# Patient Record
Sex: MALE | Race: WHITE | Employment: FULL TIME | URBAN - METROPOLITAN AREA
[De-identification: names, ages, dates, MRNs, and addresses within clinical notes are randomized per-mention and may not be internally consistent; named-entity substitution may affect disease eponyms.]

---

## 2019-08-15 ENCOUNTER — HOSPITAL ENCOUNTER (EMERGENCY)
Age: 59
Discharge: HOME | End: 2019-08-15
Attending: EMERGENCY MEDICINE
Payer: COMMERCIAL

## 2019-08-15 ENCOUNTER — APPOINTMENT (OUTPATIENT)
Dept: GENERAL RADIOLOGY | Age: 59
End: 2019-08-15
Payer: COMMERCIAL

## 2019-08-15 VITALS
HEIGHT: 70 IN | TEMPERATURE: 98 F | DIASTOLIC BLOOD PRESSURE: 75 MMHG | WEIGHT: 201 LBS | BODY MASS INDEX: 28.77 KG/M2 | HEART RATE: 82 BPM | OXYGEN SATURATION: 93 % | SYSTOLIC BLOOD PRESSURE: 166 MMHG | RESPIRATION RATE: 18 BRPM

## 2019-08-15 DIAGNOSIS — S46.912A STRAIN OF LEFT SHOULDER, INITIAL ENCOUNTER: Primary | ICD-10-CM

## 2019-08-15 PROCEDURE — 99283 EMERGENCY DEPT VISIT LOW MDM: CPT

## 2019-08-15 PROCEDURE — 6370000000 HC RX 637 (ALT 250 FOR IP): Performed by: PHYSICIAN ASSISTANT

## 2019-08-15 PROCEDURE — 73030 X-RAY EXAM OF SHOULDER: CPT

## 2019-08-15 RX ORDER — IBUPROFEN 600 MG/1
600 TABLET ORAL ONCE
Status: COMPLETED | OUTPATIENT
Start: 2019-08-15 | End: 2019-08-15

## 2019-08-15 RX ADMIN — IBUPROFEN 600 MG: 600 TABLET, FILM COATED ORAL at 18:08

## 2019-08-15 SDOH — HEALTH STABILITY: MENTAL HEALTH: HOW OFTEN DO YOU HAVE A DRINK CONTAINING ALCOHOL?: NEVER

## 2019-08-15 ASSESSMENT — PAIN SCALES - GENERAL
PAINLEVEL_OUTOF10: 8
PAINLEVEL_OUTOF10: 8

## 2019-08-15 NOTE — ED PROVIDER NOTES
Placed This Encounter   Medications    ibuprofen (ADVIL;MOTRIN) tablet 600 mg       -------------------------      CRITICAL CARE:    CONSULTS:  None    PROCEDURES:  Procedures    FINAL IMPRESSION      1. Strain of left shoulder, initial encounter          DISPOSITION/PLAN   DISPOSITION Decision To Discharge 08/15/2019 06:21:59 PM      PATIENT REFERREDTO:  Leny Morel  [place] [address] [phone]  Schedule an appointment as soon as possible for a visit in 1 day      [facility]  [place] [address] [phone]    If symptoms worsen      DISCHARGEMEDICATIONS:  There are no discharge medications for this patient.       (Please note that portions of this note were completed with a voice recognition program.  Efforts were made to edit thedictations but occasionally words are mis-transcribed.)    JENNIFER Kraft PA-C  08/15/19 1918

## 2020-09-29 ENCOUNTER — HOSPITAL ENCOUNTER (OUTPATIENT)
Dept: GENERAL RADIOLOGY | Age: 60
Discharge: HOME | End: 2020-10-01
Payer: COMMERCIAL

## 2020-09-29 ENCOUNTER — HOSPITAL ENCOUNTER (OUTPATIENT)
Age: 60
Discharge: HOME | End: 2020-09-29
Payer: COMMERCIAL

## 2020-09-29 PROCEDURE — 73030 X-RAY EXAM OF SHOULDER: CPT

## 2020-10-12 ENCOUNTER — HOSPITAL ENCOUNTER (OUTPATIENT)
Dept: PHYSICAL THERAPY | Age: 60
Setting detail: THERAPIES SERIES
Discharge: HOME | End: 2020-10-12
Payer: COMMERCIAL

## 2020-10-12 PROCEDURE — 97110 THERAPEUTIC EXERCISES: CPT

## 2020-10-12 PROCEDURE — 97016 VASOPNEUMATIC DEVICE THERAPY: CPT

## 2020-10-12 PROCEDURE — 97161 PT EVAL LOW COMPLEX 20 MIN: CPT

## 2020-10-12 NOTE — CONSULTS
he used to     Pain:  [x] Yes  [] No Location: L shoulder Pain Rating: (0-10 scale) 8/10  Pain altered Tx:  [x] Yes  [] No  Action: limited tolerance to exercise at this date due to sharp pain    Symptoms:[x] Same  Better: holding arm at waist level, ice  Worse: overhead reach, lifting arm to write or use turn signal, lifting  Sleep:[x] Disturbed - positioning    Objective:     ROM  °A/P END FEEL STRENGTH TESTS (+/-) Left Right Not Tested    Left Right  Left Right Drop Arm   []   Sit Shld Flex 128 pain    150 PROM 152  3 5 Sulcus Sign   []   Sit Shld Abd 44 pain    120 PROM 155  2+ 5 Apprehension   []   Sit Shld IR      Raul   []   Shoulder Flex      Speeds +  []   Ext      Neer   []   ABD      Mary    []   ER @ 0  81 80  4+ 5 Painful Arc +  []   IR    4+ 5 Tinel   []   Elbow Flex. 4 5       Elbow Ext.    4+ 5       Upper trap compensation with L shoulder AROM    OBSERVATION No Deficit Deficit Not Tested Comments   Forward Head [x] [] []    Rounded Shoulders [x] [] []    Kyphosis [x] [] []    Scap Height/Position [] [x] [] L slight superior when compared to the R due to increased UT involvement   Winging [] [x] []    SH Rhythm [] [x] []    INSPECTION/PALPATION       SC/AC Joint [] [x] [] Tender to palpation around the [facility] joint   Supraspinatus [] [x] [] Tender to palpation   Biceps tendon/groove [] [x] [] Tender to palpation of proximal biceps tendon   Posterior shld [] [x] [] Tender to palpation of infraspinatus   Subscapularis [x] [] []    NEUROLOGICAL       Cervical ROM/Quadrant [] [x] [] 10% impaired R side bending  All other cervical ROM WFL   Reflexes [] [] [x]    Compression/Distraction [x] [] []    Sensation [x] [] []      Functional Test: Upper Extremity Functional Index (UEFI) Score: 65% functionally impaired     Comments:    Assessment:  Pt presents with L shoulder pain and difficulty performing overhead activities.  Pt with significant limitations into L shoulder abduction, able to improve by approximately 80 degrees passively compared to actively. Pt reporting increased anterior and posterior shoulder pain. Physical therapy signs and symptoms consistent with L RTC strain with potential tear. Patient would benefit from skilled physical therapy services in order to: increase strength, ROM, functional activity tolerance and to decrease pain in order to return to full duty at work. Problems:    [x] ? Pain: L shoulder, 8/10  [x] ? ROM: impaired L shoulder elevation  [x] ? Strength: impaired L shoulder strength  [x] ? Function: UEFI = 65% functional impairment  [] Other:      STG: (to be met in 9 treatments)  1. ? Pain: Pt will report decreased L shoulder pain to no greater than 4/10 with overhead activities. 2. ? ROM: Pt will increase L shoulder abduction AROM to 100 degrees in order to improve tolerance to reaching activities required for work. 3. ? Strength: Pt will increase L shoulder strength to 4/5 globally in order to return to lifting and carrying activities. 4. ? Function: Pt will demonstrate improved functional activity tolerance as evident by an improved score on the UEFI to less than 40% functional impairment. 5. Patient to be independent with home exercise program as demonstrated by performance with correct form without cues. Patient goals: \"no stabbing pain\"    Rehab Potential:  [] Good  [x] Fair  [] Poor   Suggested Professional Referral:  [x] No  [] Yes:  Barriers to Goal Achievement:  [] No  [x] Yes: potential extent of RTC involvement  Domestic Concerns:  [x] No  [] Yes:    Pt. Education:  [x] Plans/Goals, Risks/Benefits discussed  [x] Home exercise program  Method of Education: [x] Verbal  [x] Demo  [x] Written  Comprehension of Education:  [x] Verbalizes understanding. [x] Demonstrates understanding. [x] Needs Review. [] Demonstrates/verbalizes understanding of HEP/Ed previously given.     Treatment Plan:  [x] Therapeutic Exercise   96391  [] Iontophoresis: 4 mg/mL Dexamethasone Sodium Phosphate  mAmin  67492   [] Therapeutic Activity  56155 [x] Vasopneumatic cold with compression  95681    [] Gait Training   95069 [] Ultrasound   48125   [] Neuromuscular Re-education  12792 [x] Electrical Stimulation Unattended  99411   [x] Manual Therapy  56172 [] Electrical Stimulation Attended  20065   [x] Instruction in HEP  [] Lumbar/Cervical Traction  76659   [] Aquatic Therapy   36037 [x] Cold/hotpack    [] Massage   69947      [] Dry Needling, 1 or 2 muscles  53241   [] Biofeedback, first 15 minutes   09594  [] Biofeedback, additional 15 minutes   41376 [] Dry Needling, 3 or more muscles  34500     []  Medication allergies reviewed for use of    Dexamethasone Sodium Phosphate 4mg/ml     with iontophoresis treatments. Pt is not allergic.        Frequency: 3 x/week for 9 visits    Todays Treatment:  Modalities:   Precautions:  Exercises:  Exercise Reps/ Time Weight/ Level Comments   PROM (flex, abd) 5 min           Cane AAROM (flex, abd) 10x5\"  Pt unable to perform 10 reps into abduction due to increased sharp pain   3 way bicep curl 10x ea     Upper trap stretch 3x30\"                 Other:    Specific Instructions for next treatment: progress ROM exercises      Evaluation Complexity:  History (Personal factors, comorbidities) [] 0 [x] 1-2 [] 3+   Exam (limitations, restrictions) [] 1-2 [x] 3 [] 4+   Clinical presentation (progression) [] Stable [x] Evolving  [] Unstable   Decision Making [x] Low [] Moderate [] High    [x] Low Complexity [] Moderate Complexity [] High Complexity       Treatment Charges: Mins Units Time in-out   [x] Evaluation       [x]  Low       []  Moderate       []  High 30 1 8:05-8:35 am   []  Modalities      [x]  Ther Exercise 15 1 8:35-8:50 am   []  Manual Therapy      []  Ther Activities      []  Aquatics      [x]  Vasocompression 15 1 8:51-9:06 am   []  Other        TOTAL TREATMENT TIME: 60    Time in: 8:00 am    Time Out: 9:07 am    Electronically signed by: Ada Lopez PT        Physician Signature:________________________________Date:__________________  By signing above or cosigning this note, I have reviewed this plan of care and certify a need for medically necessary rehabilitation services.      *PLEASE SIGN ABOVE AND FAX BACK ALL PAGES*

## 2020-10-14 ENCOUNTER — HOSPITAL ENCOUNTER (OUTPATIENT)
Dept: PHYSICAL THERAPY | Age: 60
Setting detail: THERAPIES SERIES
Discharge: HOME | End: 2020-10-14
Payer: COMMERCIAL

## 2020-10-14 PROCEDURE — 97112 NEUROMUSCULAR REEDUCATION: CPT

## 2020-10-14 PROCEDURE — 97110 THERAPEUTIC EXERCISES: CPT

## 2020-10-19 ENCOUNTER — HOSPITAL ENCOUNTER (OUTPATIENT)
Dept: PHYSICAL THERAPY | Age: 60
Setting detail: THERAPIES SERIES
Discharge: HOME | End: 2020-10-19
Payer: COMMERCIAL

## 2020-10-19 PROCEDURE — 97110 THERAPEUTIC EXERCISES: CPT

## 2020-10-19 NOTE — FLOWSHEET NOTE
[address] [facility]              [address]              Phone: [phone]              Fax: [phone]    Physical Therapy Daily Treatment Note    Date:  10/19/2020  Patient Name:  Mariajose Morales    :  1960  MRN: [id number]  Physician: Dr. Oskar Zamorano MD                                Insurance: [facility] 3x/wk for 9 visits  Medical Diagnosis: S46.012D - Strain of L rotator cuff                     Rehab Codes: N58.208Y, M25.512, M62.81  Onset Date: 2020                      Next 's appt:   Visit# / total visits: 3/9    Cancels/No Shows: 0/0    Subjective:    Pain:  [x] Yes  [] No Location: L shoulder Pain Rating: (0-10 scale) 6-8/10  Pain altered Tx:  [] No  [] Yes  Action:  Comments: Pt continues to report high pain levels. He does note that he is taking less Tylenol, PRN, reports that he did not take any prior to coming in today. Pt reports that an MRI has been ordered. Objective:  Modalities: cold pack (CP) to L shoulder x 10 min  Precautions:  Exercises:  Exercise Reps/ Time Weight/ Level    UBE 2'/2'  x         PROM (flex, abd) 12 min   x            Cane AAROM (flex, abd) 10x5\"   x   3 way bicep curl 10x ea  2# x   Upper trap stretch 3x30\"   x   Seated scap retraction  10x5\"    x   Shoulder isometrics  10x5\" ea   x   Table slides 10x ea     Other:     Specific Instructions for next treatment: progress ROM exercises           Treatment Charges: Mins Units Time in-out   [x]  Modalities: CP 10 -- 8:45-8:55 am   [x]  Ther Exercise 40 3 8:04-8:44 am   []  Manual Therapy      []  Ther Activities      []  Aquatics      []  Vasocompression      []  Other      Total Treatment time 40 3 8:04-8:44 am   UBE warm up not billed, 8:00-8:04 am    Assessment: [] Progressing toward goals. [x] No change. Pt continues to demonstrate pain with AA/AROM of the L shoulder, reporting sharp pains specifically with abduction.  Focused on continuing to restore ROM at this date. Pt reporting increased pain levels following cane AAROM to 8/10. [] Other:  [x] Patient would continue to benefit from skilled physical therapy services in order to: restore motion and strength in order to return to full work duties without increased pain or difficulty. STG: (to be met in 9 treatments)  1. ? Pain: Pt will report decreased L shoulder pain to no greater than 4/10 with overhead activities. 2. ? ROM: Pt will increase L shoulder abduction AROM to 100 degrees in order to improve tolerance to reaching activities required for work. 3. ? Strength: Pt will increase L shoulder strength to 4/5 globally in order to return to lifting and carrying activities. 4. ? Function: Pt will demonstrate improved functional activity tolerance as evident by an improved score on the UEFI to less than 40% functional impairment. 5. Patient to be independent with home exercise program as demonstrated by performance with correct form without cues.                    Patient goals: \"no stabbing pain\"    Pt. Education:  [x] Yes  [] No  [x] Reviewed Prior HEP/Ed  Method of Education: [] Verbal  [x] Demo  [] Written  Comprehension of Education:  [] Verbalizes understanding. [x] Demonstrates understanding. [x] Needs review. [] Demonstrates/verbalizes HEP/Ed previously given. Plan: [x] Continue current frequency toward long and short term goals.     [x] Specific Instructions for subsequent treatments: progress to AROM as able      Time In: 8:00 am            Time Out: 8:55 am    Electronically signed by:  Ace Zhang, PT

## 2020-10-20 ENCOUNTER — HOSPITAL ENCOUNTER (OUTPATIENT)
Dept: PHYSICAL THERAPY | Age: 60
Setting detail: THERAPIES SERIES
Discharge: HOME | End: 2020-10-20
Payer: COMMERCIAL

## 2020-10-20 PROCEDURE — 97110 THERAPEUTIC EXERCISES: CPT

## 2020-10-20 NOTE — FLOWSHEET NOTE
[address] [facility]              [address]              Phone: [phone]              Fax: [phone]    Physical Therapy Daily Treatment Note    Date:  10/20/2020  Patient Name:  Francisco Javier Huizar    :  1960  MRN: [id number]  Physician: Dr. Glenda Romo MD                                Insurance: [facility] 3x/wk for 9 visits  Medical Diagnosis: S46.012D - Strain of L rotator cuff                     Rehab Codes: U10.608M, M25.512, M62.81  Onset Date: 2020                      Next 's appt:   Visit# / total visits:     Cancels/No Shows: 0/0    Subjective:    Pain:  [x] Yes  [] No Location: L shoulder Pain Rating: (0-10 scale) 6/10  Pain altered Tx:  [] No  [] Yes  Action:  Comments: Pt reports he has noticed a difference in pain since switching to ibuprofen at night before bed states he wakes up less stiff. Objective:  Modalities: cold pack (CP) to L shoulder x 10 min- denied need to be at work earlier today. Precautions:  Exercises:  Exercise Reps/ Time Weight/ Level    UBE 2'/2'  x         PROM (flex, abd) 12 min   x            Cane AAROM (flex, abd) 10x5\"   x   3 way bicep curl 10x ea  2# x   Upper trap stretch 3x30\"   x   Seated scap retraction  10x5\"    x   Shoulder isometrics  10x5\" ea   x   Maudie Canton #5 1x   x   \"W\" ball on wall 10x ea  x   Pulley stretch  10x5\" ea  Fwd/lat/ scaption          Table slides 10x ea     Other:     Specific Instructions for next treatment: progress ROM exercises           Treatment Charges: Mins Units Time in-out   [x]  Modalities: CP      [x]  Ther Exercise 45 3 8:04-8:49   []  Manual Therapy      []  Ther Activities      []  Aquatics      []  Vasocompression      []  Other      Total Treatment time 45 3 8:04-8:49 am   UBE warm up not billed, 8:00-8:04 am    Assessment: [] Progressing toward goals. [x] No change.  Pt continues toto noted pain at end range during active and active assistive range of motion exercises. Did not end with cold pack this date due to patient having time restrictions for work. [] Other:  [x] Patient would continue to benefit from skilled physical therapy services in order to: restore motion and strength in order to return to full work duties without increased pain or difficulty. STG: (to be met in 9 treatments)  1. ? Pain: Pt will report decreased L shoulder pain to no greater than 4/10 with overhead activities. 2. ? ROM: Pt will increase L shoulder abduction AROM to 100 degrees in order to improve tolerance to reaching activities required for work. 3. ? Strength: Pt will increase L shoulder strength to 4/5 globally in order to return to lifting and carrying activities. 4. ? Function: Pt will demonstrate improved functional activity tolerance as evident by an improved score on the UEFI to less than 40% functional impairment. 5. Patient to be independent with home exercise program as demonstrated by performance with correct form without cues.                    Patient goals: \"no stabbing pain\"    Pt. Education:  [x] Yes  [] No  [x] Reviewed Prior HEP/Ed  Method of Education: [] Verbal  [x] Demo  [] Written  Comprehension of Education:  [] Verbalizes understanding. [x] Demonstrates understanding. [x] Needs review. [] Demonstrates/verbalizes HEP/Ed previously given. Plan: [x] Continue current frequency toward long and short term goals.     [x] Specific Instructions for subsequent treatments: progress to AROM as able      Time In: 8:00 am            Time Out: 8:45 am    Electronically signed by:  Familia Woodard PTA

## 2020-10-22 ENCOUNTER — APPOINTMENT (OUTPATIENT)
Dept: PHYSICAL THERAPY | Age: 60
End: 2020-10-22
Payer: COMMERCIAL

## 2020-10-26 ENCOUNTER — HOSPITAL ENCOUNTER (OUTPATIENT)
Dept: PHYSICAL THERAPY | Age: 60
Setting detail: THERAPIES SERIES
Discharge: HOME | End: 2020-10-26
Payer: COMMERCIAL

## 2020-10-26 PROCEDURE — 97110 THERAPEUTIC EXERCISES: CPT

## 2020-10-26 NOTE — FLOWSHEET NOTE
[address] [facility] [facility]               [address]              Phone: [phone]              Fax: [phone]    Physical Therapy Daily Treatment Note    Date:  10/26/2020  Patient Name:  Deette Leyden    :  1960  MRN: [id number]  Physician: Dr. Horace So MD                                Insurance: [employer] 3x/wk for 9 visits  Medical Diagnosis: S46.012D - Strain of L rotator cuff                     Rehab Codes: N43.636I, M25.512, M62.81  Onset Date: 2020                      Next 's appt:   Visit# / total visits:     Cancels/No Shows: 0/0    Subjective:    Pain:  [x] Yes  [] No Location: L shoulder Pain Rating: (0-10 scale) 6/10  Pain altered Tx:  [] No  [] Yes  Action:  Comments: Pt reports he has more of a pinpointed pain in anterior shoulder region that hurts mostly with abduction movement. Objective:  Modalities: cold pack (CP) to L shoulder x 10 min- denied need to be at work earlier today. Precautions:  Exercises:  Exercise Reps/ Time Weight/ Level    UBE 2'/2'  x         PROM (flex, abd) 12 min   x            Cane AAROM (flex, abd) 10x5\"   x   3 way bicep curl 10x ea  2# x   Upper trap stretch 3x30\"   x   Seated scap retraction  10x5\"    x   Shoulder isometrics  10x5\" ea   x   Carlean Helm #5 1x   x   \"W\" ball on wall 10x ea  x   Pulley stretch  10x5\" ea  Fwd/lat/ scaption          Table slides 10x ea     Other:     Specific Instructions for next treatment: progress ROM exercises           Treatment Charges: Mins Units Time in-out   [x]  Modalities: CP      [x]  Ther Exercise 45 3 8:05-8:50   []  Manual Therapy      []  Ther Activities      []  Aquatics      []  Vasocompression      []  Other      Total Treatment time 45 3 8:05-8:50 am   UBE warm up not billed, 8:01-8:05 am    Assessment: [] Progressing toward goals. [x] No change.  Pt had increased pain symptoms with most exercises this date often noted it to be sharp and radites to mid delt. Pain during PROM this date especially with abduction. [] Other:  [x] Patient would continue to benefit from skilled physical therapy services in order to: restore motion and strength in order to return to full work duties without increased pain or difficulty. STG: (to be met in 9 treatments)  1. ? Pain: Pt will report decreased L shoulder pain to no greater than 4/10 with overhead activities. 2. ? ROM: Pt will increase L shoulder abduction AROM to 100 degrees in order to improve tolerance to reaching activities required for work. 3. ? Strength: Pt will increase L shoulder strength to 4/5 globally in order to return to lifting and carrying activities. 4. ? Function: Pt will demonstrate improved functional activity tolerance as evident by an improved score on the UEFI to less than 40% functional impairment. 5. Patient to be independent with home exercise program as demonstrated by performance with correct form without cues.                    Patient goals: \"no stabbing pain\"    Pt. Education:  [x] Yes  [] No  [x] Reviewed Prior HEP/Ed  Method of Education: [] Verbal  [x] Demo  [] Written  Comprehension of Education:  [] Verbalizes understanding. [x] Demonstrates understanding. [x] Needs review. [] Demonstrates/verbalizes HEP/Ed previously given. Plan: [x] Continue current frequency toward long and short term goals.     [x] Specific Instructions for subsequent treatments: progress to AROM as able      Time In: 8:01 am            Time Out: 8:50 am    Electronically signed by:  Raghav Kimble PTA

## 2020-10-27 ENCOUNTER — HOSPITAL ENCOUNTER (OUTPATIENT)
Dept: PHYSICAL THERAPY | Age: 60
Setting detail: THERAPIES SERIES
Discharge: HOME | End: 2020-10-27
Payer: COMMERCIAL

## 2020-10-27 PROCEDURE — 97110 THERAPEUTIC EXERCISES: CPT

## 2020-10-27 NOTE — FLOWSHEET NOTE
[address] [facility] Out[facility] Therapy              [address]              Phone: [phone]              Fax: [phone]    Physical Therapy Daily Treatment Note    Date:  10/27/2020  Patient Name:  Jamie Hinton    :  1960  MRN: [id number]  Physician: Dr. Johan Slade MD                                Insurance: [facility] 3x/wk for 9 visits  Medical Diagnosis: S46.012D - Strain of L rotator cuff                     Rehab Codes: P33.846L, M25.512, M62.81  Onset Date: 2020                      Next 's appt:   Visit# / total visits:     Cancels/No Shows: 0/0    Subjective:    Pain:  [x] Yes  [] No Location: L shoulder Pain Rating: (0-10 scale) 610  Pain altered Tx:  [] No  [] Yes  Action:  Comments:once again patient reports limited abduction and ER movement along with pain when he tries to actively stretch or complete exercise in those two directions. Objective:  Modalities: cold pack (CP) to L shoulder x 10 min- denied    Precautions:  Exercises:  Exercise Reps/ Time Weight/ Level    UBE 2'/2'  x         PROM (flex, abd) 12 min   x            Cane AAROM (flex, abd) 10x5\"   x   3 way bicep curl 10x ea  2# x   Upper trap stretch 3x30\"   x   Seated scap retraction  10x5\"    x   Shoulder isometrics  10x5\" ea   x   Gannon Jump #5 1x   x   \"W\" ball on wall 10x ea  x   Pulley stretch  10x5\" ea  Fwd/lat/ scaption x         Table slides 10x ea  x   Supine scap protraction 10x  x   Supine scap X,O 10ea  cw,ccw x         Other:     Specific Instructions for next treatment: progress ROM exercises           Treatment Charges: Mins Units Time in-out   [x]  Modalities: CP      [x]  Ther Exercise 45 3 8:05-8:50   []  Manual Therapy      []  Ther Activities      []  Aquatics      []  Vasocompression      []  Other      Total Treatment time 45 3 8:05-8:50 am   UBE warm up not billed, 8:01-8:05 am    Assessment: [] Progressing toward goals. [x] No change.  Continued to have pain at end range especially in abd and ER. Patient demonstrated weakness during supine scapular exercises this date causing shoulder fatigue and tremors but subsided after short rest breaks. [] Other:  [x] Patient would continue to benefit from skilled physical therapy services in order to: restore motion and strength in order to return to full work duties without increased pain or difficulty. STG: (to be met in 9 treatments)  1. ? Pain: Pt will report decreased L shoulder pain to no greater than 4/10 with overhead activities. 2. ? ROM: Pt will increase L shoulder abduction AROM to 100 degrees in order to improve tolerance to reaching activities required for work. 3. ? Strength: Pt will increase L shoulder strength to 4/5 globally in order to return to lifting and carrying activities. 4. ? Function: Pt will demonstrate improved functional activity tolerance as evident by an improved score on the UEFI to less than 40% functional impairment. 5. Patient to be independent with home exercise program as demonstrated by performance with correct form without cues.                    Patient goals: \"no stabbing pain\"    Pt. Education:  [x] Yes  [] No  [x] Reviewed Prior HEP/Ed  Method of Education: [] Verbal  [x] Demo  [] Written  Comprehension of Education:  [] Verbalizes understanding. [x] Demonstrates understanding. [x] Needs review. [] Demonstrates/verbalizes HEP/Ed previously given. Plan: [x] Continue current frequency toward long and short term goals.     [x] Specific Instructions for subsequent treatments: progress to AROM as able      Time In: 8:01 am            Time Out: 8:50 am    Electronically signed by:  Karime Rosario PTA

## 2020-10-28 ENCOUNTER — HOSPITAL ENCOUNTER (OUTPATIENT)
Dept: MRI IMAGING | Facility: CLINIC | Age: 60
Discharge: HOME | End: 2020-10-30
Payer: COMMERCIAL

## 2020-10-28 PROCEDURE — 73221 MRI JOINT UPR EXTREM W/O DYE: CPT

## 2020-10-29 ENCOUNTER — HOSPITAL ENCOUNTER (OUTPATIENT)
Dept: PHYSICAL THERAPY | Age: 60
Setting detail: THERAPIES SERIES
Discharge: HOME | End: 2020-10-29
Payer: COMMERCIAL

## 2020-10-29 PROCEDURE — 97110 THERAPEUTIC EXERCISES: CPT

## 2020-10-29 NOTE — PROGRESS NOTES
[address] [facility]              [address]              Phone: [phone]              Fax: [phone]     Physical Therapy Daily Treatment Note     Date:  10/14/2020  Patient Name:  Pan Fuller                   :  1960                   MRN: [id number]  Physician: Dr. Bebeto Bailey MD                                Insurance: WC 3x/wk for 9 visits  Medical Diagnosis: S46.012D - Strain of L rotator cuff                     Rehab Codes: S46.012D, M25.512, M62.81  Onset Date: 2020                      Next 's appt:   Visit# / total visits:                         Cancels/No Shows: 0/0     Subjective:    Pain:  [x]? Yes  []? No   Location: L shoulder   Pain Rating: (0-10 scale) 6-8/10  Pain altered Tx:  []? No  []? Yes  Action:  Comments: Pt continues to report high pain levels. Very limited with active elevation of the shoulder at this time.     Objective:  Modalities: cold pack (CP) to L shoulder x 10 min  Precautions:  Exercises:  Exercise Reps/ Time Weight/ Level     UBE 2'/2'   x             PROM (flex, abd) 12 min   x             Cane AAROM (flex, abd) 10x5\"   x   3 way bicep curl 10x ea  2# x   Upper trap stretch 3x30\"   x   Seated scap retraction  10x5\"    x   Shoulder isometrics  10x5\" ea   x   Table slides 10x ea    x   Other:     Specific Instructions for next treatment: progress ROM exercises              Treatment Charges: Mins Units Time in-out   [x]? Modalities: CP 10 -- 8:45-8:55 am   [x]? Ther Exercise 40 3 8:00-8:40 am   []? Manual Therapy         []? Ther Activities         []? Aquatics         []? Vasocompression         []? Other         Total Treatment time 40 3 8:00-8:40am   CP not counted in treatment time.     Assessment:  []? Progressing toward goals. [x]? No change. Pt very limited with AROM, added ISOMS and other light stretching of the neck and shoulder within tolerance. Reviewed table slides and ISOMs for home as well verbally. []? Other:  [x]? Patient would continue to benefit from skilled physical therapy services in order to: restore motion and strength in order to return to full work duties without increased pain or difficulty.     STG: (to be met in 9 treatments)  1. ? Pain: Pt will report decreased L shoulder pain to no greater than 4/10 with overhead activities. 2. ? ROM: Pt will increase L shoulder abduction AROM to 100 degrees in order to improve tolerance to reaching activities required for work. 3. ? Strength: Pt will increase L shoulder strength to 4/5 globally in order to return to lifting and carrying activities. 4. ? Function: Pt will demonstrate improved functional activity tolerance as evident by an improved score on the UEFI to less than 40% functional impairment.   5. Patient to be independent with home exercise program as demonstrated by performance with correct form without cues.                   Patient goals: \"no stabbing pain\"     Pt. Education:  [x]? Yes  []? No  [x]? Reviewed Prior HEP/Ed  Method of Education: []? Verbal  [x]? Demo  []? Written  Comprehension of Education:  []? Verbalizes understanding. [x]? Demonstrates understanding. [x]? Needs review. []? Demonstrates/verbalizes HEP/Ed previously given.              Plan:    [x]? Continue current frequency toward long and short term goals. [x]?  Specific Instructions for subsequent treatments: progress to AROM as able                            Time In: 8:00 am            Time Out: 8:55 am     Electronically signed by:  Oleg Martinez PT

## 2020-10-29 NOTE — FLOWSHEET NOTE
scapular exercises. [] Other:  [x] Patient would continue to benefit from skilled physical therapy services in order to: restore motion and strength in order to return to full work duties without increased pain or difficulty. STG: (to be met in 9 treatments)  1. ? Pain: Pt will report decreased L shoulder pain to no greater than 4/10 with overhead activities. 2. ? ROM: Pt will increase L shoulder abduction AROM to 100 degrees in order to improve tolerance to reaching activities required for work. 3. ? Strength: Pt will increase L shoulder strength to 4/5 globally in order to return to lifting and carrying activities. 4. ? Function: Pt will demonstrate improved functional activity tolerance as evident by an improved score on the UEFI to less than 40% functional impairment. 5. Patient to be independent with home exercise program as demonstrated by performance with correct form without cues.                    Patient goals: \"no stabbing pain\"    Pt. Education:  [x] Yes  [] No  [x] Reviewed Prior HEP/Ed  Method of Education: [] Verbal  [x] Demo  [] Written  Comprehension of Education:  [] Verbalizes understanding. [x] Demonstrates understanding. [x] Needs review. [] Demonstrates/verbalizes HEP/Ed previously given. Plan: [x] Continue current frequency toward long and short term goals.     [x] Specific Instructions for subsequent treatments: progress to AROM as able      Time In: 8:00 am            Time Out: 8:49 am    Electronically signed by:  Shelbi Trejo PTA

## 2020-11-04 NOTE — DISCHARGE SUMMARY
[address] [facility]              [address] [facility] [address]              Phone: [phone]              Fax: [phone]      Physical Therapy Discharge Note    Date: 2020      Patient: Mitchell Schumacher  : 1960  MRN: [id number]    Physician: Dr. Rachel Reddy MD                                Insurance: WC 3x/wk for 9 visits  Medical Diagnosis: S46.012D - Strain of L rotator cuff                     Rehab RMJEO: Y64.547U, M25.512, M62.81  Onset Date: 2020                      Next 's appt:   Visit# / total visits:                         Cancels/No Shows: 0/0  Date of initial visit: 10/12/2020                Date of final visit: 10/29/2020      Subjective:  Unplanned discharge. Pt to have surgery for L rotator cuff repair due to continued high pain levels and little change in strength and motion throughout therapy. Assessment:  STG: (to be met in 9 treatments)  1. ? Pain: Pt will report decreased L shoulder pain to no greater than 4/10 with overhead activities. - not met  2. ? ROM: Pt will increase L shoulder abduction AROM to 100 degrees in order to improve tolerance to reaching activities required for work. - not met  3. ? Strength: Pt will increase L shoulder strength to 4/5 globally in order to return to lifting and carrying activities. - not met  4. ? Function: Pt will demonstrate improved functional activity tolerance as evident by an improved score on the UEFI to less than 40% functional impairment.  - not assessed  5. Patient to be independent with home exercise program as demonstrated by performance with correct form without cues.  - not met consistently                    Patient goals: \"no stabbing pain\"    Treatment to Date:  [x] Therapeutic Exercise    [] Modalities:  [] Therapeutic Activity    [] Ultrasound  [] Electrical Stimulation  [] Gait Training     [] Massage       [] Lumbar/Cervical Traction  [] Neuromuscular Re-education [] Cold/hotpack [] Iontophoresis: 4 mg/mL  [x] Instruction in Home Exercise Program                     Dexamethasone Sodium  [] Manual Therapy             Phosphate 40-80 mAmin  [] Aquatic Therapy                   [] Vasocompression/    [] Other:             Game Ready    Discharge Status:     [] Pt recovered from conditions. Treatment goals were met. [] Pt received maximum benefit. No further therapy indicated at this time. [] Pt to continue exercise/home instructions independently. [x] Therapy interrupted due to: pt to have surgery    [] Pt has 2 or more no shows/cancels, is discontinued per our policy. [] Pt has completed prescribed number of treatment sessions. [] Other:         Electronically signed by Lucy Putnam PT on 11/4/2020 at 11:23 AM      If you have any questions or concerns, please don't hesitate to call.   Thank you for your referral.

## 2021-02-08 ENCOUNTER — HOSPITAL ENCOUNTER (OUTPATIENT)
Age: 61
Discharge: HOME | End: 2021-02-08
Payer: COMMERCIAL

## 2021-02-08 ENCOUNTER — HOSPITAL ENCOUNTER (OUTPATIENT)
Dept: PREADMISSION TESTING | Age: 61
Discharge: HOME | End: 2021-02-12
Payer: COMMERCIAL

## 2021-02-08 VITALS
TEMPERATURE: 97.1 F | WEIGHT: 207 LBS | RESPIRATION RATE: 16 BRPM | DIASTOLIC BLOOD PRESSURE: 86 MMHG | BODY MASS INDEX: 29.63 KG/M2 | SYSTOLIC BLOOD PRESSURE: 137 MMHG | HEART RATE: 76 BPM | HEIGHT: 70 IN | OXYGEN SATURATION: 98 %

## 2021-02-08 LAB
ABSOLUTE EOS #: 0.3 K/UL (ref 0–0.4)
ABSOLUTE IMMATURE GRANULOCYTE: ABNORMAL K/UL (ref 0–0.3)
ABSOLUTE LYMPH #: 2.8 K/UL (ref 1–4.8)
ABSOLUTE MONO #: 0.8 K/UL (ref 0.1–1.3)
ALT SERPL-CCNC: 27 U/L (ref 5–41)
ANION GAP SERPL CALCULATED.3IONS-SCNC: 8 MMOL/L (ref 9–17)
BASOPHILS # BLD: 1 % (ref 0–2)
BASOPHILS ABSOLUTE: 0.1 K/UL (ref 0–0.2)
BUN BLDV-MCNC: 18 MG/DL (ref 8–23)
BUN/CREAT BLD: ABNORMAL (ref 9–20)
CALCIUM SERPL-MCNC: 9.6 MG/DL (ref 8.6–10.4)
CHLORIDE BLD-SCNC: 103 MMOL/L (ref 98–107)
CHOLESTEROL/HDL RATIO: 5.6
CHOLESTEROL: 196 MG/DL
CO2: 27 MMOL/L (ref 20–31)
CREAT SERPL-MCNC: 0.75 MG/DL (ref 0.7–1.2)
DIFFERENTIAL TYPE: ABNORMAL
EOSINOPHILS RELATIVE PERCENT: 3 % (ref 0–4)
GFR AFRICAN AMERICAN: >60 ML/MIN
GFR NON-AFRICAN AMERICAN: >60 ML/MIN
GFR SERPL CREATININE-BSD FRML MDRD: ABNORMAL ML/MIN/{1.73_M2}
GFR SERPL CREATININE-BSD FRML MDRD: ABNORMAL ML/MIN/{1.73_M2}
GLUCOSE BLD-MCNC: 116 MG/DL (ref 70–99)
HCT VFR BLD CALC: 48.9 % (ref 41–53)
HDLC SERPL-MCNC: 35 MG/DL
HEMOGLOBIN: 16.6 G/DL (ref 13.5–17.5)
IMMATURE GRANULOCYTES: ABNORMAL %
LDL CHOLESTEROL: 116 MG/DL (ref 0–130)
LYMPHOCYTES # BLD: 30 % (ref 24–44)
MCH RBC QN AUTO: 32.3 PG (ref 26–34)
MCHC RBC AUTO-ENTMCNC: 34 G/DL (ref 31–37)
MCV RBC AUTO: 94.9 FL (ref 80–100)
MONOCYTES # BLD: 8 % (ref 1–7)
NRBC AUTOMATED: ABNORMAL PER 100 WBC
PDW BLD-RTO: 12.6 % (ref 11.5–14.9)
PLATELET # BLD: 256 K/UL (ref 150–450)
PLATELET ESTIMATE: ABNORMAL
PMV BLD AUTO: 8.1 FL (ref 6–12)
POTASSIUM SERPL-SCNC: 4.5 MMOL/L (ref 3.7–5.3)
PROSTATE SPECIFIC ANTIGEN: 2.69 UG/L
RBC # BLD: 5.15 M/UL (ref 4.5–5.9)
RBC # BLD: ABNORMAL 10*6/UL
SEG NEUTROPHILS: 58 % (ref 36–66)
SEGMENTED NEUTROPHILS ABSOLUTE COUNT: 5.4 K/UL (ref 1.3–9.1)
SODIUM BLD-SCNC: 138 MMOL/L (ref 135–144)
TRIGL SERPL-MCNC: 225 MG/DL
VITAMIN D 25-HYDROXY: 31.2 NG/ML (ref 30–100)
VLDLC SERPL CALC-MCNC: ABNORMAL MG/DL (ref 1–30)
WBC # BLD: 9.3 K/UL (ref 3.5–11)
WBC # BLD: ABNORMAL 10*3/UL

## 2021-02-08 PROCEDURE — 82306 VITAMIN D 25 HYDROXY: CPT

## 2021-02-08 PROCEDURE — 85025 COMPLETE CBC W/AUTO DIFF WBC: CPT

## 2021-02-08 PROCEDURE — 80048 BASIC METABOLIC PNL TOTAL CA: CPT

## 2021-02-08 PROCEDURE — 93005 ELECTROCARDIOGRAM TRACING: CPT | Performed by: SURGERY

## 2021-02-08 PROCEDURE — 36415 COLL VENOUS BLD VENIPUNCTURE: CPT

## 2021-02-08 PROCEDURE — 80061 LIPID PANEL: CPT

## 2021-02-08 PROCEDURE — G0103 PSA SCREENING: HCPCS

## 2021-02-08 PROCEDURE — 84460 ALANINE AMINO (ALT) (SGPT): CPT

## 2021-02-08 RX ORDER — MULTIVIT WITH MINERALS/LUTEIN
250 TABLET ORAL DAILY
COMMUNITY

## 2021-02-08 RX ORDER — CHLORAL HYDRATE 500 MG
1000 CAPSULE ORAL DAILY
COMMUNITY

## 2021-02-08 RX ORDER — ACETAMINOPHEN 500 MG
500 TABLET ORAL EVERY 6 HOURS PRN
COMMUNITY

## 2021-02-08 RX ORDER — LANOLIN ALCOHOL/MO/W.PET/CERES
1000 CREAM (GRAM) TOPICAL DAILY
COMMUNITY

## 2021-02-08 NOTE — H&P (VIEW-ONLY)
HISTORY and [id number]       NAME:  Adelia Burns  MRN: [id number]   YOB: 1960   Date: 2/8/2021   Age: [address] y.o. Gender: male       COMPLAINT AND PRESENT HISTORY:   Adelia Burns is [address] y.o.,   male, undergoing preadmission testing for Colonoscopy Diagnostic and hemorrhoidectomy W/EUS By Dr Magalys Santa. Prior Colonoscopy was done 5 years with  polyp removed. HPI:  Patient states, he  has hx of Colon Polyps x2 was removed 5 years ago. He denies any  FH of Colon Cancer. Patient denies constipation or diarrhea ,  No GI /Rectal bleeding, experiencing red/ black/ BRBPR stools. Pt states, once a while he notice some blood on the tissue when he wiping himself, from hemorrhoid. Patient has no history or complain of abdominal  pain, no nausea or vomiting, no heartburn. Patient denies any Dysphagia. Pt denies fever/chills, chest pain or SOB, no problem with anesthesia, no Hx of MRSA infection. No anticoagulant medication or aspirin use     PMH:  High triglycerides pt on Omega-3 fatty acids (fish oil)    PAST MEDICAL HISTORY     Past Medical History:   Diagnosis Date    High triglycerides        SURGICAL HISTORY       Past Surgical History:   Procedure Laterality Date    APPENDECTOMY      COLONOSCOPY      x 2    HERNIA REPAIR Bilateral     inguinal    TONSILLECTOMY         FAMILY HISTORY     History reviewed. No pertinent family history.     SOCIAL HISTORY       Social History     Socioeconomic History    Marital status:      Spouse name: None    Number of children: None    Years of education: None    Highest education level: None   Occupational History    None   Social Needs    Financial resource strain: None    Food insecurity     Worry: None     Inability: None    Transportation needs     Medical: None     Non-medical: None   Tobacco Use    Smoking status: Current Every Day Smoker     Packs/day: 1.00     Types: Cigarettes  Smokeless tobacco: Never Used   Substance and Sexual Activity    Alcohol use: Never     Frequency: Never    Drug use: Never    Sexual activity: None   Lifestyle    Physical activity     Days per week: None     Minutes per session: None    Stress: None   Relationships    Social connections     Talks on phone: None     Gets together: None     Attends [employer] service: None     Active member of club or organization: None     Attends meetings of clubs or organizations: None     Relationship status: None    Intimate partner violence     Fear of current or ex partner: None     Emotionally abused: None     Physically abused: None     Forced sexual activity: None   Other Topics Concern    None   Social History Narrative    None           REVIEW OF SYSTEMS      Allergies   Allergen Reactions    Asa [Aspirin] Nausea Only       Current Outpatient Medications on File Prior to Encounter   Medication Sig Dispense Refill    vitamin B-12 (CYANOCOBALAMIN) 1000 MCG tablet Take 1,000 mcg by mouth daily      Ascorbic Acid (VITAMIN C) 250 MG tablet Take 250 mg by mouth daily      Omega-3 Fatty Acids (FISH OIL) 1000 MG CAPS Take 1,000 mg by mouth daily      Misc Natural Products (GLUCOSAMINE CHOND CMP ADVANCED PO) Take by mouth daily      acetaminophen (TYLENOL) 500 MG tablet Take 500 mg by mouth every 6 hours as needed for Pain       No current facility-administered medications on file prior to encounter. Negative except for what is mentioned in the HPI. GENERAL PHYSICAL EXAM     Vitals: /86   Pulse 76   Temp 97.1 °F (36.2 °C)   Resp 16   Ht 5' 10\" (1.778 m)   Wt 207 lb (93.9 kg)   SpO2 98%   BMI 29.70 kg/m²  Body mass index is 29.7 kg/m². GENERAL APPEARANCE:   Maine Tovar is 61 y.o.,  male, not obese, nourished, conscious, alert. Does not appear to be distress or pain at this time. SKIN:  Warm, dry, no cyanosis or jaundice. HEAD:  Normocephalic, atraumatic, no swelling or tenderness. EYES:  Pupils equal, reactive to light. EARS:  No discharge, no marked hearing loss. NOSE:  No rhinorrhea, epistaxis or septal deformity. THROAT:  Not congested. No ulceration bleeding or discharge. NECK:  No stiffness, trachea central.  No palpable masses or L.N.                 CHEST:  Symmetrical and equal on expansion. HEART:  RRR S1 > S2. No audible murmurs or gallops. LUNGS:  Equal on expansion, normal breath sounds. No adventitious sounds. ABDOMEN:  Soft on palpation. No dysphagia, No localized tenderness. No guarding or rigidity. No palpable hepatosplenomegaly. Bowel sounds active in all 4 quadrants. LYMPHATICS:  No palpable cervical lymphadenopathy. LOCOMOTOR, BACK AND SPINE:  No tenderness or deformities. EXTREMITIES:  Symmetrical, no pretibial edema. No calf tenderness, No discoloration or ulcerations. NEUROLOGIC:  The patient is conscious, alert, oriented, No apparent focal sensory or motor deficits.              PROVISIONAL DIAGNOSES / SURGERY:      RECTAL BLEEDING HEMORRHOIDS  HEMORRHOIDECTOMY W/EUS  COLONOSCOPY DIAGNOSTIC    Patient Active Problem List    Diagnosis Date Noted    High triglycerides            MIGUEL Pruett CNP on 2/8/2021 at 10:31 AM

## 2021-02-08 NOTE — H&P
 Smokeless tobacco: Never Used   Substance and Sexual Activity    Alcohol use: Never     Frequency: Never    Drug use: Never    Sexual activity: None   Lifestyle    Physical activity     Days per week: None     Minutes per session: None    Stress: None   Relationships    Social connections     Talks on phone: None     Gets together: None     Attends Mandaeism service: None     Active member of club or organization: None     Attends meetings of clubs or organizations: None     Relationship status: None    Intimate partner violence     Fear of current or ex partner: None     Emotionally abused: None     Physically abused: None     Forced sexual activity: None   Other Topics Concern    None   Social History Narrative    None           REVIEW OF SYSTEMS      Allergies   Allergen Reactions    Asa [Aspirin] Nausea Only       Current Outpatient Medications on File Prior to Encounter   Medication Sig Dispense Refill    vitamin B-12 (CYANOCOBALAMIN) 1000 MCG tablet Take 1,000 mcg by mouth daily      Ascorbic Acid (VITAMIN C) 250 MG tablet Take 250 mg by mouth daily      Omega-3 Fatty Acids (FISH OIL) 1000 MG CAPS Take 1,000 mg by mouth daily      Misc Natural Products (GLUCOSAMINE CHOND CMP ADVANCED PO) Take by mouth daily      acetaminophen (TYLENOL) 500 MG tablet Take 500 mg by mouth every 6 hours as needed for Pain       No current facility-administered medications on file prior to encounter. Negative except for what is mentioned in the HPI. GENERAL PHYSICAL EXAM     Vitals: /86   Pulse 76   Temp 97.1 °F (36.2 °C)   Resp 16   Ht 5' 10\" (1.778 m)   Wt 207 lb (93.9 kg)   SpO2 98%   BMI 29.70 kg/m²  Body mass index is 29.7 kg/m². GENERAL APPEARANCE:   Bakari Camarena is 61 y.o.,  male, not obese, nourished, conscious, alert. Does not appear to be distress or pain at this time. SKIN:  Warm, dry, no cyanosis or jaundice. HEAD:  Normocephalic, atraumatic, no swelling or tenderness. EYES:  Pupils equal, reactive to light. EARS:  No discharge, no marked hearing loss. NOSE:  No rhinorrhea, epistaxis or septal deformity. THROAT:  Not congested. No ulceration bleeding or discharge. NECK:  No stiffness, trachea central.  No palpable masses or L.N.                 CHEST:  Symmetrical and equal on expansion. HEART:  RRR S1 > S2. No audible murmurs or gallops. LUNGS:  Equal on expansion, normal breath sounds. No adventitious sounds. ABDOMEN:  Soft on palpation. No dysphagia, No localized tenderness. No guarding or rigidity. No palpable hepatosplenomegaly. Bowel sounds active in all 4 quadrants. LYMPHATICS:  No palpable cervical lymphadenopathy. LOCOMOTOR, BACK AND SPINE:  No tenderness or deformities. EXTREMITIES:  Symmetrical, no pretibial edema. No calf tenderness, No discoloration or ulcerations. NEUROLOGIC:  The patient is conscious, alert, oriented, No apparent focal sensory or motor deficits.              PROVISIONAL DIAGNOSES / SURGERY:      RECTAL BLEEDING HEMORRHOIDS  HEMORRHOIDECTOMY W/EUS  COLONOSCOPY DIAGNOSTIC    Patient Active Problem List    Diagnosis Date Noted    High triglycerides            MIGUEL Pruett CNP on 2/8/2021 at 10:31 AM

## 2021-02-10 LAB
EKG ATRIAL RATE: 66 BPM
EKG P AXIS: 77 DEGREES
EKG P-R INTERVAL: 174 MS
EKG Q-T INTERVAL: 376 MS
EKG QRS DURATION: 110 MS
EKG QTC CALCULATION (BAZETT): 394 MS
EKG R AXIS: -70 DEGREES
EKG T AXIS: 40 DEGREES
EKG VENTRICULAR RATE: 66 BPM

## 2021-02-10 PROCEDURE — 93010 ELECTROCARDIOGRAM REPORT: CPT | Performed by: INTERNAL MEDICINE

## 2021-02-15 ENCOUNTER — HOSPITAL ENCOUNTER (OUTPATIENT)
Dept: LAB | Age: 61
Setting detail: SPECIMEN
Discharge: HOME | End: 2021-02-15
Payer: COMMERCIAL

## 2021-02-15 DIAGNOSIS — Z01.818 PRE-OP TESTING: Primary | ICD-10-CM

## 2021-02-15 PROCEDURE — U0005 INFEC AGEN DETEC AMPLI PROBE: HCPCS

## 2021-02-15 PROCEDURE — U0003 INFECTIOUS AGENT DETECTION BY NUCLEIC ACID (DNA OR RNA); SEVERE ACUTE RESPIRATORY SYNDROME CORONAVIRUS 2 (SARS-COV-2) (CORONAVIRUS DISEASE [COVID-19]), AMPLIFIED PROBE TECHNIQUE, MAKING USE OF HIGH THROUGHPUT TECHNOLOGIES AS DESCRIBED BY CMS-2020-01-R: HCPCS

## 2021-02-16 LAB
SARS-COV-2: NORMAL
SARS-COV-2: NOT DETECTED
SOURCE: NORMAL

## 2021-02-17 ENCOUNTER — TELEPHONE (OUTPATIENT)
Dept: PRIMARY CARE CLINIC | Age: 61
End: 2021-02-17

## 2021-02-18 ENCOUNTER — ANESTHESIA EVENT (OUTPATIENT)
Dept: OPERATING ROOM | Age: 61
End: 2021-02-18
Payer: COMMERCIAL

## 2021-02-19 ENCOUNTER — ANESTHESIA (OUTPATIENT)
Dept: OPERATING ROOM | Age: 61
End: 2021-02-19
Payer: COMMERCIAL

## 2021-02-19 ENCOUNTER — HOSPITAL ENCOUNTER (OUTPATIENT)
Age: 61
Setting detail: OUTPATIENT SURGERY
Discharge: HOME | End: 2021-02-19
Attending: SURGERY | Admitting: SURGERY
Payer: COMMERCIAL

## 2021-02-19 VITALS
HEIGHT: 70 IN | BODY MASS INDEX: 29.63 KG/M2 | HEART RATE: 70 BPM | TEMPERATURE: 97 F | OXYGEN SATURATION: 97 % | RESPIRATION RATE: 14 BRPM | WEIGHT: 207 LBS | SYSTOLIC BLOOD PRESSURE: 129 MMHG | DIASTOLIC BLOOD PRESSURE: 77 MMHG

## 2021-02-19 VITALS — OXYGEN SATURATION: 93 % | DIASTOLIC BLOOD PRESSURE: 68 MMHG | SYSTOLIC BLOOD PRESSURE: 115 MMHG | TEMPERATURE: 94.6 F

## 2021-02-19 DIAGNOSIS — K64.4 EXTERNAL HEMORRHOID: Primary | ICD-10-CM

## 2021-02-19 PROCEDURE — 3700000001 HC ADD 15 MINUTES (ANESTHESIA): Performed by: SURGERY

## 2021-02-19 PROCEDURE — 7100000031 HC ASPR PHASE II RECOVERY - ADDTL 15 MIN: Performed by: SURGERY

## 2021-02-19 PROCEDURE — 3600000012 HC SURGERY LEVEL 2 ADDTL 15MIN: Performed by: SURGERY

## 2021-02-19 PROCEDURE — 99999 PR OFFICE/OUTPT VISIT,PROCEDURE ONLY: CPT | Performed by: PHYSICIAN ASSISTANT

## 2021-02-19 PROCEDURE — 88304 TISSUE EXAM BY PATHOLOGIST: CPT

## 2021-02-19 PROCEDURE — 7100000001 HC PACU RECOVERY - ADDTL 15 MIN: Performed by: SURGERY

## 2021-02-19 PROCEDURE — 2500000003 HC RX 250 WO HCPCS: Performed by: NURSE ANESTHETIST, CERTIFIED REGISTERED

## 2021-02-19 PROCEDURE — 7100000000 HC PACU RECOVERY - FIRST 15 MIN: Performed by: SURGERY

## 2021-02-19 PROCEDURE — 7100000010 HC PHASE II RECOVERY - FIRST 15 MIN: Performed by: SURGERY

## 2021-02-19 PROCEDURE — 2580000003 HC RX 258: Performed by: ANESTHESIOLOGY

## 2021-02-19 PROCEDURE — 6360000002 HC RX W HCPCS: Performed by: NURSE ANESTHETIST, CERTIFIED REGISTERED

## 2021-02-19 PROCEDURE — 3600000002 HC SURGERY LEVEL 2 BASE: Performed by: SURGERY

## 2021-02-19 PROCEDURE — 2709999900 HC NON-CHARGEABLE SUPPLY: Performed by: SURGERY

## 2021-02-19 PROCEDURE — 7100000030 HC ASPR PHASE II RECOVERY - FIRST 15 MIN: Performed by: SURGERY

## 2021-02-19 PROCEDURE — 6370000000 HC RX 637 (ALT 250 FOR IP): Performed by: SURGERY

## 2021-02-19 PROCEDURE — 88305 TISSUE EXAM BY PATHOLOGIST: CPT

## 2021-02-19 PROCEDURE — 2720000010 HC SURG SUPPLY STERILE: Performed by: SURGERY

## 2021-02-19 PROCEDURE — 3700000000 HC ANESTHESIA ATTENDED CARE: Performed by: SURGERY

## 2021-02-19 PROCEDURE — 7100000011 HC PHASE II RECOVERY - ADDTL 15 MIN: Performed by: SURGERY

## 2021-02-19 PROCEDURE — 2500000003 HC RX 250 WO HCPCS: Performed by: SURGERY

## 2021-02-19 RX ORDER — HYDRALAZINE HYDROCHLORIDE 20 MG/ML
5 INJECTION INTRAMUSCULAR; INTRAVENOUS EVERY 10 MIN PRN
Status: DISCONTINUED | OUTPATIENT
Start: 2021-02-19 | End: 2021-02-19 | Stop reason: HOSPADM

## 2021-02-19 RX ORDER — HYDROCODONE BITARTRATE AND ACETAMINOPHEN 5; 325 MG/1; MG/1
1 TABLET ORAL PRN
Status: DISCONTINUED | OUTPATIENT
Start: 2021-02-19 | End: 2021-02-19 | Stop reason: HOSPADM

## 2021-02-19 RX ORDER — DIBUCAINE 0.28 G/28G
OINTMENT TOPICAL PRN
Status: DISCONTINUED | OUTPATIENT
Start: 2021-02-19 | End: 2021-02-19 | Stop reason: ALTCHOICE

## 2021-02-19 RX ORDER — MIDAZOLAM HYDROCHLORIDE 1 MG/ML
INJECTION INTRAMUSCULAR; INTRAVENOUS PRN
Status: DISCONTINUED | OUTPATIENT
Start: 2021-02-19 | End: 2021-02-19 | Stop reason: SDUPTHER

## 2021-02-19 RX ORDER — LABETALOL HYDROCHLORIDE 5 MG/ML
5 INJECTION, SOLUTION INTRAVENOUS EVERY 10 MIN PRN
Status: DISCONTINUED | OUTPATIENT
Start: 2021-02-19 | End: 2021-02-19 | Stop reason: HOSPADM

## 2021-02-19 RX ORDER — MORPHINE SULFATE 2 MG/ML
2 INJECTION, SOLUTION INTRAMUSCULAR; INTRAVENOUS EVERY 5 MIN PRN
Status: DISCONTINUED | OUTPATIENT
Start: 2021-02-19 | End: 2021-02-19 | Stop reason: HOSPADM

## 2021-02-19 RX ORDER — DIPHENHYDRAMINE HYDROCHLORIDE 50 MG/ML
12.5 INJECTION INTRAMUSCULAR; INTRAVENOUS
Status: DISCONTINUED | OUTPATIENT
Start: 2021-02-19 | End: 2021-02-19 | Stop reason: HOSPADM

## 2021-02-19 RX ORDER — SODIUM CHLORIDE 0.9 % (FLUSH) 0.9 %
10 SYRINGE (ML) INJECTION EVERY 12 HOURS SCHEDULED
Status: DISCONTINUED | OUTPATIENT
Start: 2021-02-19 | End: 2021-02-19 | Stop reason: HOSPADM

## 2021-02-19 RX ORDER — OXYCODONE HYDROCHLORIDE AND ACETAMINOPHEN 5; 325 MG/1; MG/1
1 TABLET ORAL EVERY 6 HOURS PRN
Qty: 28 TABLET | Refills: 0 | Status: SHIPPED | OUTPATIENT
Start: 2021-02-19 | End: 2021-02-26

## 2021-02-19 RX ORDER — FENTANYL CITRATE 50 UG/ML
25 INJECTION, SOLUTION INTRAMUSCULAR; INTRAVENOUS EVERY 5 MIN PRN
Status: DISCONTINUED | OUTPATIENT
Start: 2021-02-19 | End: 2021-02-19 | Stop reason: HOSPADM

## 2021-02-19 RX ORDER — METOCLOPRAMIDE HYDROCHLORIDE 5 MG/ML
10 INJECTION INTRAMUSCULAR; INTRAVENOUS
Status: DISCONTINUED | OUTPATIENT
Start: 2021-02-19 | End: 2021-02-19 | Stop reason: HOSPADM

## 2021-02-19 RX ORDER — LIDOCAINE HYDROCHLORIDE 20 MG/ML
INJECTION, SOLUTION EPIDURAL; INFILTRATION; INTRACAUDAL; PERINEURAL PRN
Status: DISCONTINUED | OUTPATIENT
Start: 2021-02-19 | End: 2021-02-19 | Stop reason: SDUPTHER

## 2021-02-19 RX ORDER — SODIUM CHLORIDE, SODIUM LACTATE, POTASSIUM CHLORIDE, CALCIUM CHLORIDE 600; 310; 30; 20 MG/100ML; MG/100ML; MG/100ML; MG/100ML
INJECTION, SOLUTION INTRAVENOUS CONTINUOUS
Status: DISCONTINUED | OUTPATIENT
Start: 2021-02-19 | End: 2021-02-19 | Stop reason: HOSPADM

## 2021-02-19 RX ORDER — BUPIVACAINE HYDROCHLORIDE AND EPINEPHRINE 5; 5 MG/ML; UG/ML
INJECTION, SOLUTION EPIDURAL; INTRACAUDAL; PERINEURAL PRN
Status: DISCONTINUED | OUTPATIENT
Start: 2021-02-19 | End: 2021-02-19 | Stop reason: ALTCHOICE

## 2021-02-19 RX ORDER — LIDOCAINE HYDROCHLORIDE 10 MG/ML
1 INJECTION, SOLUTION EPIDURAL; INFILTRATION; INTRACAUDAL; PERINEURAL
Status: DISCONTINUED | OUTPATIENT
Start: 2021-02-19 | End: 2021-02-19 | Stop reason: HOSPADM

## 2021-02-19 RX ORDER — SODIUM CHLORIDE 0.9 % (FLUSH) 0.9 %
10 SYRINGE (ML) INJECTION PRN
Status: DISCONTINUED | OUTPATIENT
Start: 2021-02-19 | End: 2021-02-19 | Stop reason: HOSPADM

## 2021-02-19 RX ORDER — FENTANYL CITRATE 50 UG/ML
INJECTION, SOLUTION INTRAMUSCULAR; INTRAVENOUS PRN
Status: DISCONTINUED | OUTPATIENT
Start: 2021-02-19 | End: 2021-02-19 | Stop reason: SDUPTHER

## 2021-02-19 RX ORDER — HYDROCODONE BITARTRATE AND ACETAMINOPHEN 5; 325 MG/1; MG/1
2 TABLET ORAL PRN
Status: DISCONTINUED | OUTPATIENT
Start: 2021-02-19 | End: 2021-02-19 | Stop reason: HOSPADM

## 2021-02-19 RX ORDER — ONDANSETRON 2 MG/ML
4 INJECTION INTRAMUSCULAR; INTRAVENOUS
Status: DISCONTINUED | OUTPATIENT
Start: 2021-02-19 | End: 2021-02-19 | Stop reason: HOSPADM

## 2021-02-19 RX ORDER — MEPERIDINE HYDROCHLORIDE 25 MG/ML
12.5 INJECTION INTRAMUSCULAR; INTRAVENOUS; SUBCUTANEOUS EVERY 5 MIN PRN
Status: DISCONTINUED | OUTPATIENT
Start: 2021-02-19 | End: 2021-02-19 | Stop reason: HOSPADM

## 2021-02-19 RX ORDER — PROPOFOL 10 MG/ML
INJECTION, EMULSION INTRAVENOUS PRN
Status: DISCONTINUED | OUTPATIENT
Start: 2021-02-19 | End: 2021-02-19 | Stop reason: SDUPTHER

## 2021-02-19 RX ORDER — GLYCOPYRROLATE 1 MG/5 ML
SYRINGE (ML) INTRAVENOUS PRN
Status: DISCONTINUED | OUTPATIENT
Start: 2021-02-19 | End: 2021-02-19 | Stop reason: SDUPTHER

## 2021-02-19 RX ORDER — CEPHALEXIN 500 MG/1
CAPSULE ORAL
Qty: 21 CAPSULE | Refills: 0 | Status: SHIPPED | OUTPATIENT
Start: 2021-02-19

## 2021-02-19 RX ORDER — ONDANSETRON 4 MG/1
TABLET, FILM COATED ORAL
Qty: 20 TABLET | Refills: 0 | Status: SHIPPED | OUTPATIENT
Start: 2021-02-19

## 2021-02-19 RX ORDER — FENTANYL CITRATE 50 UG/ML
50 INJECTION, SOLUTION INTRAMUSCULAR; INTRAVENOUS EVERY 5 MIN PRN
Status: DISCONTINUED | OUTPATIENT
Start: 2021-02-19 | End: 2021-02-19 | Stop reason: HOSPADM

## 2021-02-19 RX ORDER — CEFAZOLIN SODIUM 1 G/3ML
INJECTION, POWDER, FOR SOLUTION INTRAMUSCULAR; INTRAVENOUS PRN
Status: DISCONTINUED | OUTPATIENT
Start: 2021-02-19 | End: 2021-02-19 | Stop reason: SDUPTHER

## 2021-02-19 RX ADMIN — PHENYLEPHRINE HYDROCHLORIDE 100 MCG: 10 INJECTION INTRAVENOUS at 13:13

## 2021-02-19 RX ADMIN — FENTANYL CITRATE 50 MCG: 50 INJECTION, SOLUTION INTRAMUSCULAR; INTRAVENOUS at 13:07

## 2021-02-19 RX ADMIN — FENTANYL CITRATE 50 MCG: 50 INJECTION, SOLUTION INTRAMUSCULAR; INTRAVENOUS at 13:55

## 2021-02-19 RX ADMIN — PHENYLEPHRINE HYDROCHLORIDE 100 MCG: 10 INJECTION INTRAVENOUS at 13:30

## 2021-02-19 RX ADMIN — SODIUM CHLORIDE, POTASSIUM CHLORIDE, SODIUM LACTATE AND CALCIUM CHLORIDE: 600; 310; 30; 20 INJECTION, SOLUTION INTRAVENOUS at 10:26

## 2021-02-19 RX ADMIN — CEFAZOLIN 2000 MG: 1 INJECTION, POWDER, FOR SOLUTION INTRAMUSCULAR; INTRAVENOUS at 13:12

## 2021-02-19 RX ADMIN — SODIUM CHLORIDE, POTASSIUM CHLORIDE, SODIUM LACTATE AND CALCIUM CHLORIDE: 600; 310; 30; 20 INJECTION, SOLUTION INTRAVENOUS at 13:55

## 2021-02-19 RX ADMIN — PROPOFOL 200 MG: 10 INJECTION, EMULSION INTRAVENOUS at 13:07

## 2021-02-19 RX ADMIN — PHENYLEPHRINE HYDROCHLORIDE 200 MCG: 10 INJECTION INTRAVENOUS at 13:47

## 2021-02-19 RX ADMIN — LIDOCAINE HYDROCHLORIDE 50 MG: 20 INJECTION, SOLUTION EPIDURAL; INFILTRATION; INTRACAUDAL; PERINEURAL at 13:07

## 2021-02-19 RX ADMIN — PHENYLEPHRINE HYDROCHLORIDE 100 MCG: 10 INJECTION INTRAVENOUS at 13:18

## 2021-02-19 RX ADMIN — Medication 0.2 MG: at 13:31

## 2021-02-19 RX ADMIN — PHENYLEPHRINE HYDROCHLORIDE 100 MCG: 10 INJECTION INTRAVENOUS at 13:23

## 2021-02-19 RX ADMIN — MIDAZOLAM 2 MG: 1 INJECTION INTRAMUSCULAR; INTRAVENOUS at 13:03

## 2021-02-19 ASSESSMENT — PULMONARY FUNCTION TESTS
PIF_VALUE: 18
PIF_VALUE: 19
PIF_VALUE: 23
PIF_VALUE: 2
PIF_VALUE: 0
PIF_VALUE: 0
PIF_VALUE: 13
PIF_VALUE: 29
PIF_VALUE: 19
PIF_VALUE: 18
PIF_VALUE: 0
PIF_VALUE: 16
PIF_VALUE: 16
PIF_VALUE: 0
PIF_VALUE: 18
PIF_VALUE: 2
PIF_VALUE: 0
PIF_VALUE: 0
PIF_VALUE: 17
PIF_VALUE: 10
PIF_VALUE: 0
PIF_VALUE: 1
PIF_VALUE: 18
PIF_VALUE: 19
PIF_VALUE: 15
PIF_VALUE: 0
PIF_VALUE: 17
PIF_VALUE: 16
PIF_VALUE: 16
PIF_VALUE: 1
PIF_VALUE: 18
PIF_VALUE: 17
PIF_VALUE: 18
PIF_VALUE: 18
PIF_VALUE: 0
PIF_VALUE: 18
PIF_VALUE: 2
PIF_VALUE: 0
PIF_VALUE: 17
PIF_VALUE: 14
PIF_VALUE: 11
PIF_VALUE: 1
PIF_VALUE: 20
PIF_VALUE: 1
PIF_VALUE: 3
PIF_VALUE: 15
PIF_VALUE: 18
PIF_VALUE: 17
PIF_VALUE: 16

## 2021-02-19 ASSESSMENT — ENCOUNTER SYMPTOMS: STRIDOR: 0

## 2021-02-19 ASSESSMENT — PAIN SCALES - GENERAL: PAINLEVEL_OUTOF10: 0

## 2021-02-19 ASSESSMENT — PAIN - FUNCTIONAL ASSESSMENT: PAIN_FUNCTIONAL_ASSESSMENT: 0-10

## 2021-02-19 ASSESSMENT — LIFESTYLE VARIABLES: SMOKING_STATUS: 1

## 2021-02-19 NOTE — ANESTHESIA PRE PROCEDURE
Department of Anesthesiology  Preprocedure Note       Name:  Eleni Hickey   Age:  61 y.o.  :  1960                                          MRN:  [id number]         Date:  2021      Surgeon: Gem White):  MD Simona Cabrera MD    Procedure: Procedure(s): HEMORRHOIDECTOMY W/EUA  COLONOSCOPY DIAGNOSTIC    Medications prior to admission:   Prior to Admission medications    Medication Sig Start Date End Date Taking? Authorizing Provider   vitamin B-12 (CYANOCOBALAMIN) 1000 MCG tablet Take 1,000 mcg by mouth daily    Historical Provider, MD   Ascorbic Acid (VITAMIN C) 250 MG tablet Take 250 mg by mouth daily    Historical Provider, MD   Omega-3 Fatty Acids (FISH OIL) 1000 MG CAPS Take 1,000 mg by mouth daily    Historical Provider, MD   Misc Natural Products (GLUCOSAMINE CHOND CMP ADVANCED PO) Take by mouth daily    Historical Provider, MD   acetaminophen (TYLENOL) 500 MG tablet Take 500 mg by mouth every 6 hours as needed for Pain    Historical Provider, MD       Current medications:    Current Facility-Administered Medications   Medication Dose Route Frequency Provider Last Rate Last Admin    lactated ringers infusion   Intravenous Continuous Eleni Lebron  mL/hr at 21 1026 New Bag at 21 1026    sodium chloride flush 0.9 % injection 10 mL  10 mL Intravenous 2 times per day Eleni Lebron MD        sodium chloride flush 0.9 % injection 10 mL  10 mL Intravenous PRN Eleni Lebron MD        lidocaine PF 1 % injection 1 mL  1 mL Intradermal Once PRN Eleni Lebron MD           Allergies:     Allergies   Allergen Reactions    Asa [Aspirin] Nausea Only       Problem List:    Patient Active Problem List   Diagnosis Code    High triglycerides E78.1       Past Medical History:        Diagnosis Date    High triglycerides        Past Surgical History:        Procedure Laterality Date    APPENDECTOMY      COLONOSCOPY      x 2    HERNIA REPAIR Bilateral inguinal    TONSILLECTOMY      WISDOM TOOTH EXTRACTION         Social History:    Social History     Tobacco Use    Smoking status: Current Every Day Smoker     Packs/day: 1.00     Types: Cigarettes    Smokeless tobacco: Never Used   Substance Use Topics    Alcohol use: Never     Frequency: Never                                Ready to quit: Not Answered  Counseling given: Not Answered      Vital Signs (Current):   Vitals:    02/19/21 1015   BP: 139/85   Pulse: 74   Resp: 16   Temp: 97.4 °F (36.3 °C)   TempSrc: Infrared   SpO2: 96%   Weight: 207 lb (93.9 kg)   Height: 5' 10\" (1.778 m)                                              BP Readings from Last 3 Encounters:   02/19/21 139/85   02/08/21 137/86   08/15/19 (!) 166/75       NPO Status: Time of last liquid consumption: 2200                        Time of last solid consumption: 2100                        Date of last liquid consumption: 02/18/21                        Date of last solid food consumption: 02/17/21    BMI:   Wt Readings from Last 3 Encounters:   02/19/21 207 lb (93.9 kg)   02/08/21 207 lb (93.9 kg)   08/15/19 201 lb (91.2 kg)     Body mass index is 29.7 kg/m². CBC:   Lab Results   Component Value Date    WBC 9.3 02/08/2021    RBC 5.15 02/08/2021    HGB 16.6 02/08/2021    HCT 48.9 02/08/2021    MCV 94.9 02/08/2021    RDW 12.6 02/08/2021     02/08/2021       CMP:   Lab Results   Component Value Date     02/08/2021    K 4.5 02/08/2021     02/08/2021    CO2 27 02/08/2021    BUN 18 02/08/2021    CREATININE 0.75 02/08/2021    GFRAA >60 02/08/2021    LABGLOM >60 02/08/2021    GLUCOSE 116 02/08/2021    CALCIUM 9.6 02/08/2021    ALT 27 02/08/2021       POC Tests: No results for input(s): POCGLU, POCNA, POCK, POCCL, POCBUN, POCHEMO, POCHCT in the last 72 hours.     Coags: No results found for: PROTIME, INR, APTT    HCG (If Applicable): No results found for: PREGTESTUR, PREGSERUM, HCG, HCGQUANT ABGs: No results found for: PHART, PO2ART, NFP1XHL, EJP2UZF, BEART, U9OGZFXV     Type & Screen (If Applicable):  No results found for: LABABO, LABRH    Drug/Infectious Status (If Applicable):  No results found for: HIV, HEPCAB    COVID-19 Screening (If Applicable):   Lab Results   Component Value Date    COVID19 Not Detected 02/15/2021         Anesthesia Evaluation  Patient summary reviewed and Nursing notes reviewed no history of anesthetic complications:   Airway: Mallampati: III  TM distance: >3 FB   Neck ROM: full  Mouth opening: > = 3 FB Dental: normal exam         Pulmonary:normal exam  breath sounds clear to auscultation  (+) sleep apnea: on noncompliant,  current smoker    (-) rhonchi, wheezes, rales and stridor                           Cardiovascular:    (+) hyperlipidemia    (-) murmur, weak pulses,  friction rub, systolic click, carotid bruit,  JVD and peripheral edema    ECG reviewed  Rhythm: regular  Rate: normal                    Neuro/Psych:   Negative Neuro/Psych ROS              GI/Hepatic/Renal: Neg GI/Hepatic/Renal ROS            Endo/Other: Negative Endo/Other ROS                    Abdominal:           Vascular: negative vascular ROS. Anesthesia Plan      general     ASA 2       Induction: intravenous. MIPS: Postoperative opioids intended and Prophylactic antiemetics administered. Anesthetic plan and risks discussed with patient. Plan discussed with CRNA.                   Iraida Goyal MD   2/19/2021

## 2021-02-19 NOTE — ANESTHESIA POSTPROCEDURE EVALUATION
Department of Anesthesiology  Postprocedure Note    Patient: Robert Sanchez  MRN: [id number]  YOB: 1960  Date of evaluation: 2/19/2021  Time:  3:42 PM     Procedure Summary     Date: 02/19/21 Room / Location: [facility]: [facility]    Anesthesia Start: 4462 Anesthesia Stop: 6138    Procedures:       HEMORRHOIDECTOMY W/EUA (N/A Anus)      COLONOSCOPY POLYPECTOMY SNARE COLD (N/A ) Diagnosis: (RECTAL BLEEDING HEMORRHOIDS)    Surgeons: Bernarda Fish MD Responsible Provider: Jonnathan Callahan MD    Anesthesia Type: general ASA Status: 2          Anesthesia Type: general    Ernie Phase I: Ernie Score: 9    Ernie Phase II: Ernie Score: 10    Last vitals: Reviewed and per EMR flowsheets.        Anesthesia Post Evaluation    Patient location during evaluation: bedside  Patient participation: complete - patient participated  Level of consciousness: awake and alert  Airway patency: patent  Nausea & Vomiting: no nausea and no vomiting  Complications: no  Cardiovascular status: hemodynamically stable  Respiratory status: acceptable  Hydration status: stable

## 2021-02-19 NOTE — OP NOTE
Scope was withdrawn back into the rectum. Scope was retroflexed. No other abnormalities were seen. Scope was straightened and removed. Patient was continued in lithotomy position. Operative site was prepped and draped in usual sterile fashion. Timeout was done. Hemorrhoidal tissue along with a small pea-sized knot in the hemorrhoidal tissue was grasped with help of an Allis clamp. This was excised using the LigaSure. Specimen was submitted to pathology. Wound was explored. Hemostasis was confirmed. Local anesthetic was infiltrated. Dibucaine ointment was applied. Clean dressing was applied. Patient tolerated procedure well and was transferred to the recovery room in a stable condition. Withdrawal Time was (minutes): 16      Next screening colonoscopy: 5 years. If screening is less than 10 years the recommended reason is due:polyps    The colon was decompressed. While withdrawing the scope the above findings were verified and the scope was removed. The patient tolerated the procedure and conscious sedation without unusual events. In the recovery room patient was examined and remains hemodynamically stable. Discharge home when criteria met. Recommendations/Plan:   1. F/U Biopsies  2. F/U In Office as instructed  3. Discussed with the family  4. High fiber diet   5. Precautions to avoid constipation  6. Postoperative care recovery restrictions follow-up were all discussed. Prescriptions called in.     Electronically signed by Lissette Kuo MD  on 2/19/2021 at 2:07 PM

## 2021-02-23 LAB — SURGICAL PATHOLOGY REPORT: NORMAL

## 2021-05-10 ENCOUNTER — HOSPITAL ENCOUNTER (OUTPATIENT)
Dept: PHYSICAL THERAPY | Age: 61
Setting detail: THERAPIES SERIES
Discharge: HOME | End: 2021-05-10
Payer: COMMERCIAL

## 2021-05-10 PROCEDURE — 97016 VASOPNEUMATIC DEVICE THERAPY: CPT

## 2021-05-10 PROCEDURE — 97161 PT EVAL LOW COMPLEX 20 MIN: CPT

## 2021-05-10 PROCEDURE — 97110 THERAPEUTIC EXERCISES: CPT

## 2021-05-10 NOTE — CONSULTS
function Current level of function Who currently assists the patient with task   Dress/grooming [x] Independent  [] Assist [x] Independent  [] Assist    Driving [x] Independent  [] Assist [x] Independent  [] Assist    Housekeeping [x] Independent  [] Assist [] Independent  [x] Assist Wife assisting with heavier household tasks   Grocery shop/meal prep [x] Independent  [] Assist [] Independent  [x] Assist Wife     Gait Prior level of function Current level of function    [x] Independent  [] Assist [x] Independent  [] Assist   Device: [x] Independent [x] Independent       Pain present? Yes   Location L shoulder (ant and post)   Pain Rating currently 3-4/10   Pain altered treatment Per protocol   Pain at worse 7-8/10   Pain at best 3-4/10   Description of pain 8/10   Altered Sensation N/A   What makes it worse Arm hanging down, lifting, carrying   What makes it better NSAIDs, rest, sling   Symptom progression Improving   Sleep Disturbed           Objective:       ROM  °AROM PROM STRENGTH TESTS (+/-) Left Right Not Tested    Left Right Left Left Right Drop Arm   []   Sit Shld Flex  158  -- 5 Sulcus Sign   []   Sit Shld Abd  155  -- 4+ Apprehension   []   Sit Shld IR      Yergasons   []   Shoulder Flex   74   Speeds   []   Ext      Neer   []   ABD   92   Mary    []   ER @ 0   78 46 -- 4+ Painful Arc   []   IR  T12  -- 5 Tinel   []   Elbow Flex. 140   -- 5       Elbow Ext. lacking 3   -- 5       Pronation            Supination            Wrist Flex. 5 5       Wrist Ext.    5 5       Rad. Dev. Ulnar Dev.                             OBSERVATION No Deficit Deficit Not Tested Comments   Forward Head [x] [] []    Rounded Shoulders [x] [] []    Kyphosis [x] [] []    Scap Height/Position [] [x] []    Winging [] [x] []    SH Rhythm [] [x] []    INSPECTION/PALPATION       SC/AC Joint [x] [] []    Supraspinatus [x] [] []    Biceps tendon/groove [] [x] [] Tender to palpation   Posterior shld [] [x] [] Tender to palpation with increased muscle spasm and guarding   Subscapularis [x] [] []    NEUROLOGICAL       Cervical ROM/Quadrant [x] [] [] Full pain-free cervical AROM   Reflexes [x] [] []    Compression/Distraction [x] [] []    Sensation [x] [] []      Functional Test: Upper Extremity Functional Index (UEFI) Score: 56% functionally impaired     Comments:    Assessment:  Pt presents with physical therapy signs and symptoms consistent following L shoulder arthroscopy with subacromial decompression, labral debridement and biceps tenodesis performed on 3/31/2021. Pt reports that he was in a sling for 4 weeks. Overall impairments in L shoulder ROM and strength that are limiting his tolerance to ADL's and ability to perform overhead activities and lifting. Patient would benefit from skilled physical therapy services in order to: restore ROM and strength with decreased pain in order to return to work related activities such as lifting and overhead motions. Problems:    [x] ? Pain: L shoulder, 3-8/10  [x] ? ROM: L shoulder ROM impairments  [x] ? Strength: global L UE weakness  [x] ? Function: UEFI = 56% functional impairment  [] Other:       STG: (to be met in 9 treatments)  1. ? Pain: Pt will report pain levels of < 5/10 with progression to AROM during PT intervention. 2. ? ROM: Pt will demonstrate full L shoulder PROM without increased pain in order to progress to active activities. 3. ? Strength: Pt will increase L shoulder and elbow strength to 4/5 globally in order to improve tolerance to ADL's with L UE.  4. Patient to be independent with home exercise program as demonstrated by performance with correct form without cues. LTG: (to be met in 18 treatments)  1. ? Function: Pt will demonstrate improved functional activity tolerance as evident by an improved score on the UEFI to <20% functional impairment.   2. ? ROM: Pt will demonstrate full L shoulder AROM when compared to the R without increased pain in order to improve tolerance to overhead activities and work related tasks. 3. ? Strength: Pt will increase L shoulder and elbow strength to 5/5 globally in order to improve tolerance to ADL's with L UE. Patient goals: \"100% no pain\"    Rehab Potential:  [x] Good  [] Fair  [] Poor   Suggested Professional Referral:  [x] No  [] Yes:  Barriers to Goal Achievement:  [x] No  [] Yes:  Domestic Concerns:  [x] No  [] Yes:    Pt. Education:  [x] Plans/Goals, Risks/Benefits discussed  [x] Home exercise program    Method of Education: [x] Verbal  [x] Demo  [x] Written  5/10/21 HEP for charted exercises to be completed 2x per day; [address] [place] [address] = [id number]  Comprehension of Education:  [x] Verbalizes understanding. [x] Demonstrates understanding. [x] Needs Review. [] Demonstrates/verbalizes understanding of HEP/Ed previously given. Treatment Plan:  [x] Therapeutic Exercise   37154  [] Iontophoresis: 4 mg/mL Dexamethasone Sodium Phosphate  mAmin  59308   [] Therapeutic Activity  06504 [x] Vasopneumatic cold with compression  46634    [] Gait Training   15983 [] Ultrasound   05752   [x] Neuromuscular Re-education  84575 [] Electrical Stimulation Unattended  05047   [x] Manual Therapy  01223 [] Electrical Stimulation Attended  23727   [x] Instruction in HEP  [] Lumbar/Cervical Traction  46595   [] Aquatic Therapy   75850 [x] Cold/hotpack    [] Massage   31378      [] Dry Needling, 1 or 2 muscles  03794   [] Biofeedback, first 15 minutes   64886  [] Biofeedback, additional 15 minutes   99415 [] Dry Needling, 3 or more muscles  91995     []  Medication allergies reviewed for use of    Dexamethasone Sodium Phosphate 4mg/ml     with iontophoresis treatments. Pt is not allergic.     Frequency:  2-3 x/week for 18 visits        Todays Treatment:  Modalities: vasocompression x15 min on min pressure and 34 degrees  Precautions:  Exercises:  Exercise Reps/ Time Weight/ Level Comments   Supine PROM  10 min  L shoulder in all planes

## 2021-05-13 ENCOUNTER — HOSPITAL ENCOUNTER (OUTPATIENT)
Dept: PHYSICAL THERAPY | Age: 61
Setting detail: THERAPIES SERIES
Discharge: HOME | End: 2021-05-13
Payer: COMMERCIAL

## 2021-05-13 PROCEDURE — 97016 VASOPNEUMATIC DEVICE THERAPY: CPT

## 2021-05-13 PROCEDURE — 97110 THERAPEUTIC EXERCISES: CPT

## 2021-05-13 NOTE — FLOWSHEET NOTE
509 [facility]              [address] [facility] [address]              Phone: [phone]              Fax: [phone]      Physical Therapy Daily Treatment Note    Date:  2021  Patient Name:  Shruti Yee    :  1960  MRN: [id number]  Physician: Dr. Charmaine Silva MD                               Insurance:  (18 Vs 3/3-6/3/21)  Medical Diagnosis: R31.274 - Biceps tendonosis of left shoulder  Rehab Codes: M67.814, M25.512, M62.81  Onset date: 2020 injury, 3/31/2021 surgery                  Next Dr's appt. : 6/3/2021  Visit# / total visits:      Cancels/No Shows: 0/0    Subjective:    Pain:  [x] Yes  [] No Location: L shoulder Pain Rating: (0-10 scale) 8/10  Pain altered Tx:  [x] No  [] Yes  Action:  Comments: Pt reports that he was sore following his evaluation on Monday but that it was no more than he expected. He reports compliance to HEP. Objective:  Modalities: vasocompression x15 min on min pressure and 34 degrees  Precautions:  Exercises:  Exercise Reps/ Time Weight/ Level Comments   Supine PROM  20 min   L shoulder in all planes, L elbow   Supine elbow ext/flex 10x5\"   Towel roll under elbow   Supine shoulder wand AAROM (flex, abd, ER) 10x5\" ea       Seated forearm pro/sup  10x ea                           Other:     Specific Instructions for next treatment: Per doctor - \"OK for shoulder ROM, recovery and active elbow ROM\"      Treatment Charges: Mins Units Time In-Time out   []  Modalities      [x]  Ther Exercise 34 2 6648-4944   []  Manual Therapy      []  Ther Activities      []  Aquatics      [x]  Vasocompression 15 1 1825-9264   []  Other      Total Treatment time 49 3        Assessment: [x] Progressing toward goals. Continued to focus on restoring ROM at this date with increased time spent on PROM. Improved tolerance to motion with decreased muscle guarding evident compared to prior treatment session.  Unable to perform 10 reps of wand abduction at this date due to increased sharp pain and patient unable to relax upper extremity in order to perform without increased AROM. Vasocompression following treatment session for pain and edema control. [] No change. [] Other:  [x] Patient would continue to benefit from skilled physical therapy services in order to: restore ROM and strength with decreased pain in order to return to work related activities such as lifting and overhead motions. STG: (to be met in 9 treatments)  1. ? Pain: Pt will report pain levels of < 5/10 with progression to AROM during PT intervention. 2. ? ROM: Pt will demonstrate full L shoulder PROM without increased pain in order to progress to active activities. 3. ? Strength: Pt will increase L shoulder and elbow strength to 4/5 globally in order to improve tolerance to ADL's with L UE.  4. Patient to be independent with home exercise program as demonstrated by performance with correct form without cues. LTG: (to be met in 18 treatments)  1. ? Function: Pt will demonstrate improved functional activity tolerance as evident by an improved score on the UEFI to <20% functional impairment. 2. ? ROM: Pt will demonstrate full L shoulder AROM when compared to the R without increased pain in order to improve tolerance to overhead activities and work related tasks. 3. ? Strength: Pt will increase L shoulder and elbow strength to 5/5 globally in order to improve tolerance to ADL's with L UE. Patient goals: \"100% no pain\"    Pt. Education:  [x] Yes  [] No  [x] Reviewed Prior HEP/Ed  Method of Education: [x] Verbal  [x] Demo  [] Written  5/10/21 HEP for charted exercises to be completed 2x per day; Camryn Canelat = [id number]  Comprehension of Education:  [x] Verbalizes understanding. [x] Demonstrates understanding. [x] Needs review. [] Demonstrates/verbalizes HEP/Ed previously given.      Plan: [x] Continue current frequency toward long and short term goals.     [x] Specific Instructions for subsequent treatments: progress per protocol      Time In: 10:00 am            Time Out: 10:51 am    Electronically signed by:  Ramesh Hein PT

## 2021-05-17 ENCOUNTER — HOSPITAL ENCOUNTER (OUTPATIENT)
Dept: PHYSICAL THERAPY | Age: 61
Setting detail: THERAPIES SERIES
Discharge: HOME | End: 2021-05-17
Payer: COMMERCIAL

## 2021-05-17 PROCEDURE — 97110 THERAPEUTIC EXERCISES: CPT

## 2021-05-17 PROCEDURE — 97016 VASOPNEUMATIC DEVICE THERAPY: CPT

## 2021-05-17 NOTE — FLOWSHEET NOTE
509 [facility]              [address] [place] [address] [address]              Phone: [phone]              Fax: [phone]      Physical Therapy Daily Treatment Note    Date:  2021  Patient Name:  Med Urban    :  1960  MRN: [id number]  Physician: Dr. Jen Basurto MD                               Insurance:  (18 Vs 3/3-6/3/21)  Medical Diagnosis: I22.243 - Biceps tendonosis of left shoulder  Rehab Codes: M67.814, M25.512, M62.81  Onset date: 2020 injury, 3/31/2021 surgery                  Next Dr's appt. : 6/3/2021  Visit# / total visits: 3/18     Cancels/No Shows: 0/0    Subjective:    Pain:  [x] Yes  [] No Location: L shoulder Pain Rating: (0-10 scale) 7-8/10  Pain altered Tx:  [x] No  [] Yes  Action:  Comments: Pt arrives without new complaint, continues to report high pain levels but increased use of arm. Objective:  Modalities: vasocompression x15 min on min pressure and 34 degrees  Precautions:  Exercises:  Exercise Reps/ Time Weight/ Level Comments   Supine PROM  15 min   L shoulder in all planes, L elbow   Supine elbow ext/flex 10x5\"   Towel roll under elbow   Supine shoulder wand AAROM (flex, abd, ER) 10x5\" ea       Seated forearm pro/sup  10x ea      Pulleys (flex, abd) 2' ea                 Other:     Specific Instructions for next treatment: Per doctor - \"OK for shoulder ROM, recovery and active elbow ROM\"    Shoulder 2021    Flexion 144        ER 80        Treatment Charges: Mins Units Time In-Time out   []  Modalities      [x]  Ther Exercise 34 2 7057-0312   []  Manual Therapy      []  Ther Activities      []  Aquatics      [x]  Vasocompression 15 1 0440-3820   []  Other      Total Treatment time 49 3        Assessment: [x] Progressing toward goals. Improvements noted in PROM in all planes at this date compared to initial evaluation last week. Decreased muscle guarding noted during PROM stretching.  Added pulleys at for subsequent treatments: progress per protocol      Time In: 10:45 am            Time Out: 11:37 am    Electronically signed by:  Fabiola Purcell PT

## 2021-05-20 ENCOUNTER — HOSPITAL ENCOUNTER (OUTPATIENT)
Dept: PHYSICAL THERAPY | Age: 61
Setting detail: THERAPIES SERIES
Discharge: HOME | End: 2021-05-20
Payer: COMMERCIAL

## 2021-05-20 PROCEDURE — 97110 THERAPEUTIC EXERCISES: CPT

## 2021-05-20 PROCEDURE — 97016 VASOPNEUMATIC DEVICE THERAPY: CPT

## 2021-05-20 NOTE — FLOWSHEET NOTE
509 [facility]              [address] [place], [place] [address] [facility] [address]              Phone: [phone]              Fax: [phone]      Physical Therapy Daily Treatment Note    Date:  2021  Patient Name:  Amanda Ray    :  1960  MRN: [id number]  Physician: Dr. Lora Gallagher MD                               Insurance:  (18 Vs 3/3-6/3/21)  Medical Diagnosis: P03.855 - Biceps tendonosis of left shoulder  Rehab Codes: M67.814, M25.512, M62.81  Onset date: 2020 injury, 3/31/2021 surgery                  Next Dr's appt. : 6/3/2021  Visit# / total visits:      Cancels/No Shows: 0/0    Subjective:    Pain:  [x] Yes  [] No Location: L shoulder Pain Rating: (0-10 scale) 7/10  Pain altered Tx:  [x] No  [] Yes  Action:  Comments: Pt thinks that he may have slept wrong on his L shoulder yesterday, he reports increased pain today. He does note that he felt good on Tuesday following Monday's treatment session and had minimal pain. Objective:  Modalities: vasocompression x15 min on min pressure and 34 degrees  Precautions:  Exercises:  Exercise Reps/ Time Weight/ Level Comments   Supine PROM  10 min   L shoulder in all planes   Supine elbow ext/flex 10x5\"   Towel roll under elbow   Supine shoulder wand AAROM (flex, abd, ER) 10x5\" ea       Seated forearm pro/sup  10x ea      Pulleys (flex, abd) 2' ea       Seated scap retraction  10x5\"       Other:     Specific Instructions for next treatment: Per doctor - \"OK for shoulder ROM, recovery and active elbow ROM\"    Shoulder 2021    Flexion 144        ER 80        Treatment Charges: Mins Units Time In-Time out   []  Modalities      [x]  Ther Exercise 34 2 3542-6464   []  Manual Therapy      []  Ther Activities      []  Aquatics      [x]  Vasocompression 15 1 8881-7246   []  Other      Total Treatment time 49 3        Assessment: [x] Progressing toward goals.  Decreased time spent on PROM during today's treatment session due to patient demonstrating improved ROM in all directions. Added scap retraction to strengthen posterior scapular musculature and improve postural awareness. Cues required to relax neck and decrease upper trap involvement. Pt continues to require cues throughout treatment session for proper demonstration of exercises. Vasocompression following treatment session for pain and edema control. [] No change. [] Other:  [x] Patient would continue to benefit from skilled physical therapy services in order to: restore ROM and strength with decreased pain in order to return to work related activities such as lifting and overhead motions. STG: (to be met in 9 treatments)  1. ? Pain: Pt will report pain levels of < 5/10 with progression to AROM during PT intervention. 2. ? ROM: Pt will demonstrate full L shoulder PROM without increased pain in order to progress to active activities. 3. ? Strength: Pt will increase L shoulder and elbow strength to 4/5 globally in order to improve tolerance to ADL's with L UE.  4. Patient to be independent with home exercise program as demonstrated by performance with correct form without cues. LTG: (to be met in 18 treatments)  1. ? Function: Pt will demonstrate improved functional activity tolerance as evident by an improved score on the UEFI to <20% functional impairment. 2. ? ROM: Pt will demonstrate full L shoulder AROM when compared to the R without increased pain in order to improve tolerance to overhead activities and work related tasks. 3. ? Strength: Pt will increase L shoulder and elbow strength to 5/5 globally in order to improve tolerance to ADL's with L UE. Patient goals: \"100% no pain\"    Pt.  Education:  [x] Yes  [] No  [x] Reviewed Prior HEP/Ed  Method of Education: [x] Verbal  [x] Demo  [] Written  5/10/21 HEP for charted exercises to be completed 2x per day; [address] [place] [address] = [id number]  5/20 - verbal for continued adherence to stretching at home  Comprehension of Education:  [x] Verbalizes understanding. [] Demonstrates understanding. [x] Needs review. [x] Demonstrates/verbalizes HEP/Ed previously given. Plan: [x] Continue current frequency toward long and short term goals.     [x] Specific Instructions for subsequent treatments: progress per protocol      Time In: 10:00 am            Time Out: 10:52 am    Electronically signed by:  Maria Elena Marquez PT

## 2021-05-24 ENCOUNTER — HOSPITAL ENCOUNTER (OUTPATIENT)
Dept: PHYSICAL THERAPY | Age: 61
Setting detail: THERAPIES SERIES
Discharge: HOME | End: 2021-05-24
Payer: COMMERCIAL

## 2021-05-24 PROCEDURE — 97016 VASOPNEUMATIC DEVICE THERAPY: CPT

## 2021-05-24 PROCEDURE — 97110 THERAPEUTIC EXERCISES: CPT

## 2021-05-24 NOTE — FLOWSHEET NOTE
509 [facility]              [address] [facility] [address]              Phone: [phone]              Fax: [phone]      Physical Therapy Daily Treatment Note    Date:  2021  Patient Name:  Nevaeh Moseley    :  1960  MRN: [id number]  Physician: Dr. Cele Garcia MD                               Insurance:  (18 Vs 3/3-6/3/21)  Medical Diagnosis: W14.526 - Biceps tendonosis of left shoulder  Rehab Codes: M67.814, M25.512, M62.81  Onset date: 2020 injury, 3/31/2021 surgery                  Next Dr's appt. : 6/3/2021  Visit# / total visits:      Cancels/No Shows: 0/0    Subjective:    Pain:  [x] Yes  [] No Location: L shoulder Pain Rating: (0-10 scale) 6/10  Pain altered Tx:  [x] No  [] Yes  Action:  Comments: Pt reports that pain levels have decreased and that he was able to have 2 good days of minimal pain following last treatment session.       Objective:  Modalities: vasocompression x15 min on min pressure and 34 degrees  Precautions:  Exercises:  Exercise Reps/ Time Weight/ Level 21 Comments   Supine PROM  5 min   x    Supine elbow ext/flex 10x5\"    Towel roll under elbow   Supine shoulder wand AAROM (flex, abd, ER) 10x5\" ea   x     Supine flex 10x  x    Serratus punches 10x red x    Sidelying ER 10x  x    Sidelying ABD 10x  x           Seated forearm pro/sup  10x ea       Pulleys (flex, abd) 2' ea   x     Seated scap retraction  10x5\"   x     Other:     Specific Instructions for next treatment: Per doctor - \"OK for shoulder ROM, recovery and active elbow ROM\"    Shoulder 2021   Flexion 144 155    130   ER 80 82       Treatment Charges: Mins Units Time In-Time out   []  Modalities      [x]  Ther Exercise 34 2 1131am-1202pm   []  Manual Therapy      []  Ther Activities      []  Aquatics      [x]  Vasocompression 15 1 1203-1218pm   []  Other      Total Treatment time 49 3        Assessment: [x] Progressing toward goals. Progressed to active exercises at this date and minimal time spent on PROM due to patient demonstrating good A/AROM. PROM stretching in abduction only at this date due to pt most limited during abduction stretching. Added sidelying abd and ER and supine flexion with fair to good tolerance, initial shakiness/pain evident. Additionally added serratus punches to improve scapular stabilization. Vasocompression following treatment session for pain and edema control. [] No change. [] Other:  [x] Patient would continue to benefit from skilled physical therapy services in order to: restore ROM and strength with decreased pain in order to return to work related activities such as lifting and overhead motions. STG: (to be met in 9 treatments)  1. ? Pain: Pt will report pain levels of < 5/10 with progression to AROM during PT intervention. 2. ? ROM: Pt will demonstrate full L shoulder PROM without increased pain in order to progress to active activities. 3. ? Strength: Pt will increase L shoulder and elbow strength to 4/5 globally in order to improve tolerance to ADL's with L UE.  4. Patient to be independent with home exercise program as demonstrated by performance with correct form without cues. LTG: (to be met in 18 treatments)  1. ? Function: Pt will demonstrate improved functional activity tolerance as evident by an improved score on the UEFI to <20% functional impairment. 2. ? ROM: Pt will demonstrate full L shoulder AROM when compared to the R without increased pain in order to improve tolerance to overhead activities and work related tasks. 3. ? Strength: Pt will increase L shoulder and elbow strength to 5/5 globally in order to improve tolerance to ADL's with L UE. Patient goals: \"100% no pain\"    Pt.  Education:  [x] Yes  [] No  [x] Reviewed Prior HEP/Ed  Method of Education: [x] Verbal  [x] Demo  [] Written  5/10/21 HEP for charted exercises to be completed 2x per day; Estefania Francis = [id number]  5/20 - verbal for continued adherence to stretching at home  Comprehension of Education:  [x] Verbalizes understanding. [] Demonstrates understanding. [x] Needs review. [x] Demonstrates/verbalizes HEP/Ed previously given. Plan: [x] Continue current frequency toward long and short term goals.     [x] Specific Instructions for subsequent treatments: progress per protocol      Time In: 11:31 am            Time Out: 12:19 pm    Electronically signed by:  Óscar York PT

## 2021-05-27 ENCOUNTER — HOSPITAL ENCOUNTER (OUTPATIENT)
Dept: PHYSICAL THERAPY | Age: 61
Setting detail: THERAPIES SERIES
Discharge: HOME | End: 2021-05-27
Payer: COMMERCIAL

## 2021-05-27 PROCEDURE — 97016 VASOPNEUMATIC DEVICE THERAPY: CPT

## 2021-05-27 PROCEDURE — 97110 THERAPEUTIC EXERCISES: CPT

## 2021-05-27 NOTE — FLOWSHEET NOTE
[address] [place] [address] Outpatient Physical Therapy              [address] [place] [address] [address]              Phone: [phone]              Fax: [phone]      Physical Therapy Daily Treatment Note    Date:  2021  Patient Name:  Chana Farris    :  1960  MRN: [id number]  Physician: Dr. Linda Hinds MD                               Insurance:  (18 Vs 3/3-6/3/21)  Medical Diagnosis: E60.882 - Biceps tendonosis of left shoulder  Rehab Codes: M67.814, M25.512, M62.81  Onset date: 2020 injury, 3/31/2021 surgery                  Next Dr's appt. : 6/3/2021  Visit# / total visits:      Cancels/No Shows: 0/0    Subjective:    Pain:  [x] Yes  [] No Location: L shoulder Pain Rating: (0-10 scale) 4-5/10  Pain altered Tx:  [x] No  [] Yes  Action:  Comments: Pt reports today with not much change in pain since last visit. Reported that he gets \"sharp stabbing\" pain with any active motion of shoulder.      Objective:  Modalities: vasocompression x15 min on min pressure and 34 degrees  Precautions:  Exercises:  Exercise Reps/ Time Weight/ Level 21 Comments   Supine PROM  5 min   x    Supine elbow ext/flex 10x5\"    Towel roll under elbow   Supine shoulder wand AAROM (flex, abd, ER) 10x5\" ea   x     Supine flex 10x  x    Serratus punches 10x red x    Sidelying ER 10x  x    Sidelying ABD 10x  x           Seated forearm pro/sup  10x ea       Pulleys (flex, abd) 2' ea   x     Seated scap retraction  10x5\"   x     Other:      Specific Instructions for next treatment: Per doctor - \"OK for shoulder ROM, recovery and active elbow ROM\"    Shoulder 2021   Flexion 144 155    130   ER 80 82       Treatment Charges: Mins Units Time In-Time out   []  Modalities      [x]  Ther Exercise 38 3 8:02-8:40   []  Manual Therapy      []  Ther Activities      []  Aquatics      [x]  Vasocompression 15 1 8:42-8:57   []  Other      Total Treatment time 53 4        Assessment: [x] Progressing toward goals. Continued exercises per chart with focus on improving L shoulder ROM with most recent progressions made last visit. Patient reported sharp stabbing pain with most active motion exercises. Vaso for pain/soreness post exercises. [] No change. [] Other:  [x] Patient would continue to benefit from skilled physical therapy services in order to: restore ROM and strength with decreased pain in order to return to work related activities such as lifting and overhead motions. STG: (to be met in 9 treatments)  1. ? Pain: Pt will report pain levels of < 5/10 with progression to AROM during PT intervention. 2. ? ROM: Pt will demonstrate full L shoulder PROM without increased pain in order to progress to active activities. 3. ? Strength: Pt will increase L shoulder and elbow strength to 4/5 globally in order to improve tolerance to ADL's with L UE.  4. Patient to be independent with home exercise program as demonstrated by performance with correct form without cues. LTG: (to be met in 18 treatments)  1. ? Function: Pt will demonstrate improved functional activity tolerance as evident by an improved score on the UEFI to <20% functional impairment. 2. ? ROM: Pt will demonstrate full L shoulder AROM when compared to the R without increased pain in order to improve tolerance to overhead activities and work related tasks. 3. ? Strength: Pt will increase L shoulder and elbow strength to 5/5 globally in order to improve tolerance to ADL's with L UE. Patient goals: \"100% no pain\"    Pt. Education:  [x] Yes  [] No  [x] Reviewed Prior HEP/Ed  Method of Education: [x] Verbal  [x] Demo  [] Written  5/10/21 HEP for charted exercises to be completed 2x per day; Monica Freddy = [id number]  5/20 - verbal for continued adherence to stretching at home  Comprehension of Education:  [x] Verbalizes understanding. [] Demonstrates understanding. [x] Needs review.   [x] Demonstrates/verbalizes HEP/Ed previously given.     Plan: [x] Continue current frequency toward long and short term goals.     [x] Specific Instructions for subsequent treatments: progress per protocol      Time In: 8:02 am            Time Out: 8:57 AM     Electronically signed by:  Rocky Lopez PTA

## 2021-06-01 ENCOUNTER — HOSPITAL ENCOUNTER (OUTPATIENT)
Dept: PHYSICAL THERAPY | Age: 61
Setting detail: THERAPIES SERIES
Discharge: HOME | End: 2021-06-01
Payer: COMMERCIAL

## 2021-06-01 PROCEDURE — 97016 VASOPNEUMATIC DEVICE THERAPY: CPT

## 2021-06-01 PROCEDURE — 97110 THERAPEUTIC EXERCISES: CPT

## 2021-06-01 PROCEDURE — 97140 MANUAL THERAPY 1/> REGIONS: CPT

## 2021-06-01 NOTE — FLOWSHEET NOTE
Prior HEP/Ed  Method of Education: [x] Verbal  [x] Demo  [] Written  5/10/21 HEP for charted exercises to be completed 2x per day; Katarzyna Eden = [id number]  5/20 - verbal for continued adherence to stretching at home  Comprehension of Education:  [x] Verbalizes understanding. [] Demonstrates understanding. [x] Needs review. [x] Demonstrates/verbalizes HEP/Ed previously given. Plan: [x] Continue current frequency toward long and short term goals.     [x] Specific Instructions for subsequent treatments: progress per protocol      Time In: 8:02 am            Time Out: 8:57 am    Electronically signed by:  Jarrod Olmstead PTA

## 2021-06-14 ENCOUNTER — HOSPITAL ENCOUNTER (OUTPATIENT)
Dept: PHYSICAL THERAPY | Age: 61
Setting detail: THERAPIES SERIES
Discharge: HOME | End: 2021-06-14
Payer: COMMERCIAL

## 2021-06-14 PROCEDURE — 97016 VASOPNEUMATIC DEVICE THERAPY: CPT

## 2021-06-14 PROCEDURE — 97110 THERAPEUTIC EXERCISES: CPT

## 2021-06-14 NOTE — FLOWSHEET NOTE
[facility] Outpatient Physical Therapy              [facility] [address] [facility] [address]              Phone: [phone]              Fax: [phone]      Physical Therapy Daily Treatment Note    Date:  2021  Patient Name:  Jonathon Adams    :  1960  MRN: [id number]  Physician: Dr. Robert Kyle MD                               Insurance:  (18 Vs 3/3-7/10/21)  Medical Diagnosis: B49.893 - Biceps tendonosis of left shoulder  Rehab Codes: M67.814, M25.512, M62.81  Onset date: 2020 injury, 3/31/2021 surgery                  Next Dr's appt. : 6/3/2021  Visit# / total visits:       Cancels/No Shows: 0/0    Subjective:    Pain:  [x] Yes  [] No Location: L shoulder Pain Rating: (0-10 scale) 0-4/10  Pain altered Tx:  [x] No  [] Yes  Action:  Comments: Pt reports that he has been noticing improvements. No longer having pain at rest and pain with use rated at 4/10. Pt reports that his doctor's appointment went well and he plans to go back to work without restrictions in 2 weeks.       Objective:  Modalities: vasocompression x15 min on min pressure and 34 degrees  Precautions:  Exercises:  Exercise Reps/ Time Weight/ Level 21 Comments   Supine PROM in combination with mobs  10 min       Supine elbow ext/flex 10x5\"    Towel roll under elbow   Supine shoulder wand AAROM (flex, abd, ER) 10x5\" ea 2#  x     Supine flex 10x 1# x    Serratus punches 10x2 red x    Supine ABC's 1x 1# x    Sidelying ER 10x 1# x    Sidelying ABD 10x 1# x    Prone (flex, abd, row, ext) 10x ea 1# x    Seated forearm pro/sup  10x ea       Pulleys (flex, abd) 2' ea   x     Seated scap retraction  10x5\"        Wall Surrenders 10x 5\" hold  x    Ball on Wall for Shoulder Stabilization 20x 2# x Up/Down, Side/Side   Other:      Specific Instructions for next treatment: PNF patterns    Shoulder 2021 AROM   Flexion 144 155 155    130 145   ER 80 82 88       Treatment Charges: Mins Units Time In-Time out   []  Modalities      [x]  Ther Exercise 38 3 9:02-9:40   []  Manual Therapy      []  Ther Activities      []  Aquatics      [x]  Vasocompression 15 1 9:41-9:56   []  Other      Total Treatment time 53 4        Assessment: [x] Progressing toward goals. Pt with significant improvement to charted exercises with improved tolerance and no longer complaining of sharp pains with exercises. Pt continues to be most challenged with abduction exercises secondary to impaired strength. Added weight to sidelying exercises, increased resistance with serratus punches and added prone strengthening exercises. Pt with evident fatigue by end of treatment session but no increases in pain noted. Vasocompression following treatment session for pain and edema control. [] No change. [] Other:  [x] Patient would continue to benefit from skilled physical therapy services in order to: restore ROM and strength with decreased pain in order to return to work related activities such as lifting and overhead motions. STG: (to be met in 9 treatments)  1. ? Pain: Pt will report pain levels of < 5/10 with progression to AROM during PT intervention. - MET 6/14/2021  2. ? ROM: Pt will demonstrate full L shoulder PROM without increased pain in order to progress to active activities. - MET 6/14/2021  3. ? Strength: Pt will increase L shoulder and elbow strength to 4/5 globally in order to improve tolerance to ADL's with L UE. - MET 6/14/2021, elbow 5/5 globally, flexion 4+/5, abduction 4/5  4. Patient to be independent with home exercise program as demonstrated by performance with correct form without cues. - MET 6/14/2021  LTG: (to be met in 18 treatments)  1. ? Function: Pt will demonstrate improved functional activity tolerance as evident by an improved score on the UEFI to <20% functional impairment.   2. ? ROM: Pt will demonstrate full L shoulder AROM when compared to the R without increased pain in order to improve tolerance to overhead activities and work related tasks. 3. ? Strength: Pt will increase L shoulder and elbow strength to 5/5 globally in order to improve tolerance to ADL's with L UE. Patient goals: \"100% no pain\"    Pt. Education:  [x] Yes  [] No  [x] Reviewed Prior HEP/Ed  Method of Education: [x] Verbal  [x] Demo  [] Written  5/10/21 HEP for charted exercises to be completed 2x per day; Emily De Jesus = [id number]  5/20 - verbal for continued adherence to stretching at home  Comprehension of Education:  [x] Verbalizes understanding. [] Demonstrates understanding. [x] Needs review. [x] Demonstrates/verbalizes HEP/Ed previously given. Plan: [x] Continue current frequency toward long and short term goals.     [x] Specific Instructions for subsequent treatments: progress per protocol      Time In: 9:02 am            Time Out: 9:57 am    Electronically signed by:  Krysta Tapia, PT

## 2021-06-17 ENCOUNTER — HOSPITAL ENCOUNTER (OUTPATIENT)
Dept: PHYSICAL THERAPY | Age: 61
Setting detail: THERAPIES SERIES
Discharge: HOME | End: 2021-06-17
Payer: COMMERCIAL

## 2021-06-17 PROCEDURE — 97110 THERAPEUTIC EXERCISES: CPT

## 2021-06-21 ENCOUNTER — HOSPITAL ENCOUNTER (OUTPATIENT)
Dept: PHYSICAL THERAPY | Age: 61
Setting detail: THERAPIES SERIES
Discharge: HOME | End: 2021-06-21
Payer: COMMERCIAL

## 2021-06-21 PROCEDURE — 97110 THERAPEUTIC EXERCISES: CPT

## 2021-06-21 NOTE — FLOWSHEET NOTE
[facility] Outpatient Physical Therapy              [address] [facility] [address]              Phone: [phone]              Fax: [phone]      Physical Therapy Daily Treatment Note    Date:  2021  Patient Name:  Carl Marrow    :  1960  MRN: [id number]  Physician: Dr. Colton Escobedo MD                               Insurance:  (18 Vs 3/3-7/10/21)  Medical Diagnosis: H32.744 - Biceps tendonosis of left shoulder  Rehab Codes: M67.814, M25.512, M62.81  Onset date: 2020 injury, 3/31/2021 surgery                  Next 's appt. : 6/3/2021  Visit# / total visits: 10/18      Cancels/No Shows: 0/0    Subjective:    Pain:  [x] Yes  [] No Location: L shoulder Pain Rating: (0-10 scale) 3-4/10  Pain altered Tx:  [x] No  [] Yes  Action:  Comments: Pt continues to report improvements, pain rated at 3-4/10 with movement.      Objective:  Modalities: vasocompression x15 min on min pressure and 34 degrees  Precautions:  Exercises:  Exercise Reps/ Time Weight/ Level 21 Comments   Supine PROM in combination with mobs  10 min       Supine elbow ext/flex 10x5\"    Towel roll under elbow   Supine shoulder wand AAROM (flex, abd, ER) 10x5\" ea 2#  x     Supine flex 10x 2# x    Serratus punches 10x2 red x    Supine ABC's 1x 2# x    Sidelying ER 10x 2# x    Sidelying ABD 10x 1# x    Prone (flex, abd, row, ext) 10x ea 2# x Performed ABD with 1#   Seated forearm pro/sup  10x ea       Pulleys (flex, abd) 2' ea   x     Seated scap retraction  10x5\"        Wall Surrenders 10x 5\" hold  x    Ball on Wall for Shoulder Stabilization 20x 2# x Up/Down, Side/Side, circles   Resistive D2 Flexion/Ext 10x ea red x    Standing flex/scap 10x 1# x    Other:      Specific Instructions for next treatment:     Shoulder 2021 AROM   Flexion 144 155 155    130 145   ER 80 82 88       Treatment Charges: Mins Units Time In-Time out   []  Modalities      [x]  Ther Exercise 41 3 8:03-8:44am   []  Manual Therapy      []  Ther Activities      []  Aquatics      []  Vasocompression      []  Other      Total Treatment time 41 3        Assessment: [x] Progressing toward goals. Increased weight with table exercises at this date and added standing flexion and scaption with visual cues to decrease upper trap involvement. Pt fatigued by the end of treatment session with standing exercises but no increases in pain noted. [] No change. [] Other:  [x] Patient would continue to benefit from skilled physical therapy services in order to: restore ROM and strength with decreased pain in order to return to work related activities such as lifting and overhead motions. STG: (to be met in 9 treatments)  1. ? Pain: Pt will report pain levels of < 5/10 with progression to AROM during PT intervention. - MET 6/14/2021  2. ? ROM: Pt will demonstrate full L shoulder PROM without increased pain in order to progress to active activities. - MET 6/14/2021  3. ? Strength: Pt will increase L shoulder and elbow strength to 4/5 globally in order to improve tolerance to ADL's with L UE. - MET 6/14/2021, elbow 5/5 globally, flexion 4+/5, abduction 4/5  4. Patient to be independent with home exercise program as demonstrated by performance with correct form without cues. - MET 6/14/2021  LTG: (to be met in 18 treatments)  1. ? Function: Pt will demonstrate improved functional activity tolerance as evident by an improved score on the UEFI to <20% functional impairment. 2. ? ROM: Pt will demonstrate full L shoulder AROM when compared to the R without increased pain in order to improve tolerance to overhead activities and work related tasks. 3. ? Strength: Pt will increase L shoulder and elbow strength to 5/5 globally in order to improve tolerance to ADL's with L UE. Patient goals: \"100% no pain\"    Pt.  Education:  [x] Yes  [] No  [x] Reviewed Prior HEP/Ed  Method of Education: [] Verbal  [x] Demo [] Written  5/10/21 HEP for charted exercises to be completed 2x per day; Hershell Nails = [id number]  5/20 - verbal for continued adherence to stretching at home  6/17 - resistive D2 flexion  Comprehension of Education:  [] Verbalizes understanding. [] Demonstrates understanding. [] Needs review. [x] Demonstrates/verbalizes HEP/Ed previously given. Plan: [x] Continue current frequency toward long and short term goals.     [x] Specific Instructions for subsequent treatments: progress per protocol      Time In: 8:03 am            Time Out: 8:44 am    Electronically signed by:  Amanda Suresh, PT

## 2021-06-24 ENCOUNTER — HOSPITAL ENCOUNTER (OUTPATIENT)
Dept: PHYSICAL THERAPY | Age: 61
Setting detail: THERAPIES SERIES
Discharge: HOME | End: 2021-06-24
Payer: COMMERCIAL

## 2021-06-24 PROCEDURE — 97110 THERAPEUTIC EXERCISES: CPT

## 2021-06-24 NOTE — FLOWSHEET NOTE
[address] [place] [address] Outpatient Physical Therapy              [address] [place] [address] [address]              Phone: [phone]              Fax: [phone]      Physical Therapy Daily Treatment Note    Date:  2021  Patient Name:  Brunilda Garcia    :  1960  MRN: [id number]  Physician: Dr. Tom Onofre MD                               Insurance:  (18 Vs 3/3-7/10/21)  Medical Diagnosis: D54.299 - Biceps tendonosis of left shoulder  Rehab Codes: M67.814, M25.512, M62.81  Onset date: 2020 injury, 3/31/2021 surgery                  Next Dr's appt. : 6/3/2021  Visit# / total visits:       Cancels/No Shows: 0/0    Subjective:    Pain:  [x] Yes  [] No Location: L shoulder Pain Rating: (0-10 scale) 0/10 at rest, 3-4/10 with motion  Pain altered Tx:  [x] No  [] Yes  Action:  Comments: Pt reports no increases in pain, he is ready to go back to work next week.      Objective:  Modalities: vasocompression x15 min on min pressure and 34 degrees  Precautions:  Exercises:  Exercise Reps/ Time Weight/ Level 21 Comments   Supine PROM in combination with mobs  10 min       Supine elbow ext/flex 10x5\"    Towel roll under elbow   UBE 2'/2'  x    Supine shoulder wand AAROM (flex, abd, ER) 10x5\" ea 2#  x     Supine flex 10x 2# x    Serratus punches 10x2 red x    Supine ABC's 1x 2# x    Sidelying ER 10x 2# x    Sidelying ABD 10x 2# x    Prone (flex, abd, row, ext) 10x ea 2# x    Seated forearm pro/sup  10x ea       Pulleys (flex, abd) 2' ea        Seated scap retraction  10x5\"        Wall Surrenders 10x 5\" hold  x    Ball on Wall for Shoulder Stabilization 20x 2# x Up/Down, Side/Side, circles   Resistive D2 Flexion/Ext 10x ea red x    Standing flex/scap/abd 10x 1# x    Other:      Specific Instructions for next treatment:     Shoulder 2021 AROM   Flexion 144 155 155    130 145   ER 80 82 88        2021  ROM  °AROM STRENGTH     Left Right Left Right   Sit Shld Flex  164 162 5 5   Sit Shld Abd  158 162 4 4+   ER @ 0   90 92 5 5   IR  T8 T7 5 5   Elbow Flex. WNL   5 5   Elbow Ext. WNL   5 5   Wrist Flex.     5 5   Wrist Ext.     5 5          Treatment Charges: Mins Units Time In-Time out   []  Modalities      [x]  Ther Exercise 44 3 9:04-9:48am   []  Manual Therapy      []  Ther Activities      []  Aquatics      []  Vasocompression      []  Other      Total Treatment time 44 3        Assessment: [x] Progressing toward goals. All mat table exercises performed with 2# weight at this date with good tolerance. Added standing shoulder abduction due to continued weakness with patient fatiguing quickly but no increases in pain noted. Plan to hold PT at this date with patient returning to work, pt will call to reschedule if he feels that he needs additional treatment sessions. [] No change. [] Other:  [x] Patient would continue to benefit from skilled physical therapy services in order to: restore ROM and strength with decreased pain in order to return to work related activities such as lifting and overhead motions. STG: (to be met in 9 treatments)  1. ? Pain: Pt will report pain levels of < 5/10 with progression to AROM during PT intervention. - MET 6/14/2021  2. ? ROM: Pt will demonstrate full L shoulder PROM without increased pain in order to progress to active activities. - MET 6/14/2021  3. ? Strength: Pt will increase L shoulder and elbow strength to 4/5 globally in order to improve tolerance to ADL's with L UE. - MET 6/14/2021, elbow 5/5 globally, flexion 4+/5, abduction 4/5  4. Patient to be independent with home exercise program as demonstrated by performance with correct form without cues.  - MET 6/14/2021  LTG: (to be met in 18 treatments)  1. ? Function: Pt will demonstrate improved functional activity tolerance as evident by an improved score on the UEFI to <20% functional impairment. - MET 6/24/2021, currently 6% functional impairment  2. ? ROM: Pt will demonstrate full L shoulder AROM when compared to the R without increased pain in order to improve tolerance to overhead activities and work related tasks. - MET 6/24/2021  3. ? Strength: Pt will increase L shoulder and elbow strength to 5/5 globally in order to improve tolerance to ADL's with L UE. - Progress made 6/24/2021, met for all motions except abduction 4/5                 Patient goals: \"100% no pain\"    Pt. Education:  [x] Yes  [] No  [x] Reviewed Prior HEP/Ed  Method of Education: [] Verbal  [x] Demo  [] Written  5/10/21 HEP for charted exercises to be completed 2x per day; Bladimir Merino = [id number]  5/20 - verbal for continued adherence to stretching at home  6/17 - resistive D2 flexion  Comprehension of Education:  [] Verbalizes understanding. [] Demonstrates understanding. [] Needs review. [x] Demonstrates/verbalizes HEP/Ed previously given. Plan: [x] Continue current frequency toward long and short term goals.     [x] Specific Instructions for subsequent treatments: progress per protocol      Time In: 9:00 am            Time Out: 9:48 am    Electronically signed by:  Tana Liu, PT

## 2021-08-03 NOTE — DISCHARGE SUMMARY
[facility] Outpatient Physical Therapy              [address]              Phone: [phone]              Fax: [phone]      Physical Therapy Discharge Note    Date: 8/3/2021      Patient: Nevaeh Moseley  : 1960  MRN: [id number]    Physician: Dr. Cele Garcia MD                               Insurance:  (18 Vs 3/3-7/10/21)  Medical Diagnosis: M67.814 - Biceps tendonosis of left shoulder  Rehab Codes: M67.814, M25.512, M62.81  Onset date: 2020 injury, 3/31/2021 surgery                  Next Dr's appt. : 6/3/2021  Visit# / total visits:                                 Cancels/No Shows: 0/0  Date of initial visit: 5/10/2021                Date of final visit: 2021      Subjective:  PT placed on hold following last treatment session for patient to trial HEP with returning to work and to call to reschedule if he had any increased difficulty or pain. Pt did not call to reschedule and is discharged at this time. Pt demonstrating significant improvements in overall L shoulder strength, ROM and tolerance to activity with decreased pain following surgery on 3/31/2021. Pt reporting pain of 0/10 at rest and 3-5/51 with certain movements. Minimal challenge noted with ADL's or overhead reaching activities. Good prognosis with continuation of strengthening exercises at home. Objective:  Shoulder 2021 AROM   Flexion 144 155 155    130 145   ER 80 82 88                2021  ROM  °AROM STRENGTH     Left Right Left Right   Sit Shld Flex  164 162 5 5   Sit Shld Abd  158 162 4 4+   ER @ 0   90 92 5 5   IR  T8 T7 5 5   Elbow Flex. WNL   5 5   Elbow Ext. WNL   5 5   Wrist Flex.     5 5   Wrist Ext.      5 5        Assessment:  STG: (to be met in 9 treatments)  1. ? Pain: Pt will report pain levels of < 5/10 with progression to AROM during PT intervention. - MET 2021  2. ? ROM: Pt will demonstrate full L shoulder PROM prescribed number of treatment sessions. [] Other:         Electronically signed by Guillermo Pennington PT on 8/3/2021 at 10:48 AM      If you have any questions or concerns, please don't hesitate to call.   Thank you for your referral.

## 2025-07-17 ENCOUNTER — OFFICE VISIT (OUTPATIENT)
Age: 65
End: 2025-07-17
Payer: COMMERCIAL

## 2025-07-17 VITALS
HEART RATE: 70 BPM | OXYGEN SATURATION: 95 % | WEIGHT: 192 LBS | HEIGHT: 70 IN | DIASTOLIC BLOOD PRESSURE: 82 MMHG | SYSTOLIC BLOOD PRESSURE: 136 MMHG | BODY MASS INDEX: 27.49 KG/M2

## 2025-07-17 DIAGNOSIS — K64.4 EXTERNAL HEMORRHOID: ICD-10-CM

## 2025-07-17 DIAGNOSIS — K63.5 POLYP OF COLON, UNSPECIFIED PART OF COLON, UNSPECIFIED TYPE: ICD-10-CM

## 2025-07-17 DIAGNOSIS — K59.01 SLOW TRANSIT CONSTIPATION: ICD-10-CM

## 2025-07-17 DIAGNOSIS — S46.112D LABRAL TEAR OF LONG HEAD OF BICEPS TENDON, LEFT, SUBSEQUENT ENCOUNTER: ICD-10-CM

## 2025-07-17 DIAGNOSIS — M67.814 BICEPS TENDONOSIS OF LEFT SHOULDER: ICD-10-CM

## 2025-07-17 DIAGNOSIS — E78.1 HIGH TRIGLYCERIDES: Primary | ICD-10-CM

## 2025-07-17 DIAGNOSIS — F17.200 SMOKER: ICD-10-CM

## 2025-07-17 DIAGNOSIS — Z00.00 WELL ADULT EXAM: ICD-10-CM

## 2025-07-17 PROCEDURE — 99205 OFFICE O/P NEW HI 60 MIN: CPT | Performed by: FAMILY MEDICINE

## 2025-07-17 NOTE — PROGRESS NOTES
[facility]  [facility]  [address]  [address]  [facility] [address]  Dept: [phone]     Date of Visit:  2025  Patient Name: Ascencion Tavarez   Patient :  1960     Ascencion Tavarez is a 64 y.o. male who presents today for an general visit to be evaluated for the following condition(s):  Chief Complaint   Patient presents with    New Patient     Patient from [facility], referral needs for colonoscopy, since colonoscopy has not been having bowel movement normal         SUBJECTIVE:  HPI   PT IS HERE TO ESTABLISH CARE FROM [place] USED TO SEE DR OLIVAREZ AND HE JUST TAKES VITAMINS HE WORKS AS  HE TAKES TYLENOL NO CP OR SOB HE WORKS AS  HE HAULS OIL HE STILL WORKING HE HAD COLONOSOCPY 4 YEARS AND HAD HEMNMORIDS TAKING OUT AND HE IS SMOKER 1/2 PPD AND HE IS THINKING ABOUT QUITING ,HE AHS CHANGES IN HIS BOWELS NOW AFTER THE HEMORRHOID HE FEELS HIS BOWELS ARE EVERY COUPLE DAYS USED TO BE EVERY DAY HE SAYS.  HE SUPPOSED TO BE GOING BACK FOR COLONOSCOPY AFTER 5 YEARS WHICH IS COMING UP NEXT YEAR HE DOES NOT SEE ANY BLOOD HE HE HAS NO ABDOMINAL PAIN.  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

## (undated) DEVICE — ENDO KIT W/SYRINGE: Brand: MEDLINE INDUSTRIES, INC.

## (undated) DEVICE — DEFENDO AIR WATER SUCTION AND BIOPSY VALVE KIT FOR  OLYMPUS: Brand: DEFENDO AIR/WATER/SUCTION AND BIOPSY VALVE

## (undated) DEVICE — GLOVE ORANGE PI 7 1/2   MSG9075

## (undated) DEVICE — SINGLE PORT MANIFOLD: Brand: NEPTUNE 2

## (undated) DEVICE — TUBING, SUCTION, 3/16" X 10', STRAIGHT: Brand: MEDLINE

## (undated) DEVICE — GAUZE,SPONGE,4"X4",16PLY,XRAY,STRL,LF: Brand: MEDLINE

## (undated) DEVICE — SOLUTION PREP POVIDONE IOD FOR SKIN MUCOUS MEM PRIOR TO

## (undated) DEVICE — SURGIFOAM SPNG SZ 100

## (undated) DEVICE — COUNTER NDL 10 COUNT HLD 20 FOAM BLK SGL MAG

## (undated) DEVICE — SNARE ENDOSCP AD SM L240CM LOOP W1.3CM SHTH DIA2.4MM POLYP

## (undated) DEVICE — DRAPE,REIN 53X77,STERILE: Brand: MEDLINE

## (undated) DEVICE — SYRINGE 20ML LL S/C 50

## (undated) DEVICE — YANKAUER,POOLE TIP,STERILE,50/CS: Brand: MEDLINE

## (undated) DEVICE — SUTURE VCRL + SZ 3-0 L27IN ABSRB UD L26MM SH 1/2 CIR VCP416H

## (undated) DEVICE — SEALER LAP SM L18.8CM OPN JAW HAND/FOOT SWCH FORCETRIAD

## (undated) DEVICE — COVER LT HNDL BLU PLAS

## (undated) DEVICE — COVER,TABLE,60X90,STERILE: Brand: MEDLINE

## (undated) DEVICE — LEGGINGS, PAIR, 33X51 XL, STERILE: Brand: MEDLINE

## (undated) DEVICE — GOWN,AURORA,NONREINFORCED,LARGE: Brand: MEDLINE

## (undated) DEVICE — HYPODERMIC SAFETY NEEDLE: Brand: MAGELLAN

## (undated) DEVICE — Z DISCONTINUED BY MEDLINE USE 2711682 TRAY SKIN PREP DRY W/ PREM GLV

## (undated) DEVICE — GLOVE ORTHO 7 1/2   MSG9475

## (undated) DEVICE — Z INACTIVE USE 2527070 DRAPE SURG W40XL44IN UNDERBUTTOCK SMS POLYPR W/ PCH BK DISP

## (undated) DEVICE — POUCH INSTR W6.75XL11.5IN FRST 2 PKT ADH FOR ORTH AND

## (undated) DEVICE — SOLUTION IV IRRIG POUR BRL 0.9% SODIUM CHL 2F7124

## (undated) DEVICE — MERCY HEALTH ST CHARLES: Brand: MEDLINE INDUSTRIES, INC.

## (undated) DEVICE — MARKER,SKIN,WI/RULER AND LABELS: Brand: MEDLINE